# Patient Record
Sex: MALE | Race: OTHER | Employment: UNEMPLOYED | URBAN - METROPOLITAN AREA
[De-identification: names, ages, dates, MRNs, and addresses within clinical notes are randomized per-mention and may not be internally consistent; named-entity substitution may affect disease eponyms.]

---

## 2021-08-10 ENCOUNTER — OFFICE VISIT (OUTPATIENT)
Dept: FAMILY MEDICINE CLINIC | Age: 56
End: 2021-08-10
Payer: COMMERCIAL

## 2021-08-10 VITALS
WEIGHT: 260.6 LBS | BODY MASS INDEX: 35.3 KG/M2 | DIASTOLIC BLOOD PRESSURE: 76 MMHG | OXYGEN SATURATION: 95 % | SYSTOLIC BLOOD PRESSURE: 122 MMHG | HEART RATE: 87 BPM | HEIGHT: 72 IN

## 2021-08-10 DIAGNOSIS — Z13.220 LIPID SCREENING: ICD-10-CM

## 2021-08-10 DIAGNOSIS — J45.20 MILD INTERMITTENT ASTHMA WITHOUT COMPLICATION: ICD-10-CM

## 2021-08-10 DIAGNOSIS — F17.200 TOBACCO USE DISORDER: ICD-10-CM

## 2021-08-10 DIAGNOSIS — H00.011 HORDEOLUM EXTERNUM OF RIGHT UPPER EYELID: ICD-10-CM

## 2021-08-10 DIAGNOSIS — E78.2 MIXED HYPERLIPIDEMIA: ICD-10-CM

## 2021-08-10 DIAGNOSIS — E03.9 ACQUIRED HYPOTHYROIDISM: Primary | ICD-10-CM

## 2021-08-10 DIAGNOSIS — F25.9 SCHIZO AFFECTIVE SCHIZOPHRENIA (HCC): ICD-10-CM

## 2021-08-10 DIAGNOSIS — R73.03 PREDIABETES: ICD-10-CM

## 2021-08-10 PROCEDURE — 3017F COLORECTAL CA SCREEN DOC REV: CPT | Performed by: FAMILY MEDICINE

## 2021-08-10 PROCEDURE — G8427 DOCREV CUR MEDS BY ELIG CLIN: HCPCS | Performed by: FAMILY MEDICINE

## 2021-08-10 PROCEDURE — G8417 CALC BMI ABV UP PARAM F/U: HCPCS | Performed by: FAMILY MEDICINE

## 2021-08-10 PROCEDURE — 4004F PT TOBACCO SCREEN RCVD TLK: CPT | Performed by: FAMILY MEDICINE

## 2021-08-10 PROCEDURE — 99204 OFFICE O/P NEW MOD 45 MIN: CPT | Performed by: FAMILY MEDICINE

## 2021-08-10 RX ORDER — RISPERIDONE 2 MG/1
2 TABLET, FILM COATED ORAL NIGHTLY
Qty: 60 TABLET | Status: SHIPPED | COMMUNITY
Start: 2021-08-10

## 2021-08-10 RX ORDER — LEVOTHYROXINE SODIUM 0.05 MG/1
50 TABLET ORAL DAILY
Qty: 90 TABLET | Refills: 3 | Status: SHIPPED | OUTPATIENT
Start: 2021-08-10

## 2021-08-10 RX ORDER — MONTELUKAST SODIUM 10 MG/1
10 TABLET ORAL NIGHTLY
Qty: 90 TABLET | Refills: 3 | Status: SHIPPED | OUTPATIENT
Start: 2021-08-10 | End: 2022-10-18

## 2021-08-10 RX ORDER — DIVALPROEX SODIUM 500 MG/1
500 TABLET, DELAYED RELEASE ORAL 3 TIMES DAILY
Qty: 90 TABLET | Status: SHIPPED | COMMUNITY
Start: 2021-08-10

## 2021-08-10 RX ORDER — ASPIRIN 81 MG/1
81 TABLET ORAL DAILY
COMMUNITY
End: 2021-08-10 | Stop reason: SDUPTHER

## 2021-08-10 RX ORDER — LEVOTHYROXINE SODIUM 0.05 MG/1
50 TABLET ORAL DAILY
COMMUNITY
End: 2021-08-10 | Stop reason: SDUPTHER

## 2021-08-10 RX ORDER — ATORVASTATIN CALCIUM 10 MG/1
10 TABLET, FILM COATED ORAL DAILY
COMMUNITY
End: 2021-08-10 | Stop reason: SDUPTHER

## 2021-08-10 RX ORDER — ASPIRIN 81 MG/1
81 TABLET ORAL DAILY
Qty: 90 TABLET | Refills: 3 | Status: SHIPPED | OUTPATIENT
Start: 2021-08-10

## 2021-08-10 RX ORDER — MONTELUKAST SODIUM 10 MG/1
10 TABLET ORAL NIGHTLY
COMMUNITY
End: 2021-08-10 | Stop reason: SDUPTHER

## 2021-08-10 RX ORDER — ATORVASTATIN CALCIUM 10 MG/1
10 TABLET, FILM COATED ORAL DAILY
Qty: 90 TABLET | Refills: 3 | Status: SHIPPED | OUTPATIENT
Start: 2021-08-10

## 2021-08-10 ASSESSMENT — PATIENT HEALTH QUESTIONNAIRE - PHQ9
SUM OF ALL RESPONSES TO PHQ QUESTIONS 1-9: 0
2. FEELING DOWN, DEPRESSED OR HOPELESS: 0
SUM OF ALL RESPONSES TO PHQ QUESTIONS 1-9: 0
SUM OF ALL RESPONSES TO PHQ QUESTIONS 1-9: 0
1. LITTLE INTEREST OR PLEASURE IN DOING THINGS: 0
SUM OF ALL RESPONSES TO PHQ9 QUESTIONS 1 & 2: 0

## 2021-08-10 ASSESSMENT — ENCOUNTER SYMPTOMS
DIARRHEA: 0
BACK PAIN: 0
ABDOMINAL PAIN: 0
COUGH: 0
CONSTIPATION: 0
SORE THROAT: 0
SHORTNESS OF BREATH: 0
SINUS PRESSURE: 0

## 2021-08-10 NOTE — PROGRESS NOTES
Arsenio Leak Marker  1965  08/10/21    Chief Complaint   Patient presents with   [facility]     Patient here to establish care with PCP           60years old man with past medical history of schizophrenic disorder, hypothyroidism, asthma, and prediabetic. Came today to establish with us, patient doing fine has no complaints and needs medication refills. He does follow-up with the psychiatrist every 3 months. His asthma under control. Past Medical History:   Diagnosis Date    Anxiety      Past Surgical History:   Procedure Laterality Date    DENTAL SURGERY       History reviewed. No pertinent family history. Social History     Socioeconomic History    Marital status: Single     Spouse name: Not on file    Number of children: Not on file    Years of education: Not on file    Highest education level: Not on file   Occupational History    Not on file   Tobacco Use    Smoking status: Current Every Day Smoker     Packs/day: 0.50     Types: Cigarettes    Smokeless tobacco: Never Used   Substance and Sexual Activity    Alcohol use: Yes    Drug use: No    Sexual activity: Yes     Partners: Female   Other Topics Concern    Not on file   Social History Narrative    Not on file     Social Determinants of Health     Financial Resource Strain:     Difficulty of Paying Living Expenses:    Food Insecurity:     Worried About Running Out of Food in the Last Year:     [address] in the Last Year:    Transportation Needs:     Lack of Transportation (Medical):      Lack of Transportation (Non-Medical):    Physical Activity:     Days of Exercise per Week:     Minutes of Exercise per Session:    Stress:     Feeling of Stress :    Social Connections:     Frequency of Communication with Friends and Family:     Frequency of Social Gatherings with Friends and Family:     Attends [employer] Services:     Active Member of Clubs or Organizations:     Attends Club or Organization Meetings:     Marital Status:    Intimate Partner Violence:     Fear of Current or Ex-Partner:     Emotionally Abused:     Physically Abused:     Sexually Abused:        No Known Allergies  Current Outpatient Medications   Medication Sig Dispense Refill    montelukast (SINGULAIR) 10 MG tablet Take 1 tablet by mouth nightly 90 tablet 3    levothyroxine (SYNTHROID) 50 MCG tablet Take 1 tablet by mouth Daily 90 tablet 3    aspirin EC 81 MG EC tablet Take 1 tablet by mouth daily 90 tablet 3    atorvastatin (LIPITOR) 10 MG tablet Take 1 tablet by mouth daily 90 tablet 3    divalproex (DEPAKOTE) 500 MG DR tablet Take 500 mg by mouth 3 times daily. (Patient not taking: Reported on 8/10/2021)      risperiDONE (RISPERDAL) 2 MG tablet Take 2 mg by mouth nightly. (Patient not taking: Reported on 8/10/2021)      risperiDONE (RISPERDAL) 0.5 MG tablet Take 0.5 mg by mouth daily. No current facility-administered medications for this visit. Review of Systems   Constitutional: Negative for activity change, appetite change, chills, fatigue and fever. HENT: Negative for congestion, postnasal drip, sinus pressure and sore throat. Respiratory: Negative for cough and shortness of breath. Cardiovascular: Negative for chest pain and leg swelling. Gastrointestinal: Negative for abdominal pain, constipation and diarrhea. Genitourinary: Negative for dysuria and frequency. Musculoskeletal: Negative for back pain and gait problem. Skin: Negative for rash. Neurological: Negative for dizziness, weakness and headaches. Psychiatric/Behavioral: Negative for agitation and behavioral problems. The patient is not nervous/anxious.         Lab Results   Component Value Date    WBC 8.6 08/25/2014    HGB 14.3 08/25/2014    HCT 42.7 08/25/2014    MCV 90.3 08/25/2014     08/25/2014     Lab Results   Component Value Date     08/25/2014    K 3.9 08/25/2014    CL 99 08/25/2014    CO2 28 08/25/2014    BUN 13 08/25/2014 CREATININE 0.8 (L) 08/25/2014    GLUCOSE 108 08/10/2014    CALCIUM 9.1 08/25/2014    PROT 6.3 (L) 08/25/2014    LABALBU 4.2 08/25/2014    BILITOT 0.3 08/25/2014    ALKPHOS 68 08/25/2014    AST 16 08/25/2014    ALT 24 08/25/2014    LABGLOM >60 08/25/2014    GFRAA >60 08/25/2014     No results found for: CHOL  No results found for: TRIG  No results found for: HDL  No results found for: LDLCALC, LDLCHOLESTEROL  No results found for: LABA1C  Lab Results   Component Value Date    TSHHS 1.450 08/25/2014         /76 (Site: Left Upper Arm, Position: Sitting, Cuff Size: Large Adult)   Pulse 87   Ht 6' (1.829 m)   Wt 260 lb 9.6 oz (118.2 kg)   SpO2 95%   BMI 35.34 kg/m²     BP Readings from Last 3 Encounters:   08/10/21 122/76       Wt Readings from Last 3 Encounters:   08/10/21 260 lb 9.6 oz (118.2 kg)         Physical Exam  Constitutional:       General: He is not in acute distress. Appearance: Normal appearance. He is well-developed. He is obese. He is not diaphoretic. HENT:      Head: Normocephalic and atraumatic. Eyes:      Pupils: Pupils are equal, round, and reactive to light. Cardiovascular:      Rate and Rhythm: Normal rate and regular rhythm. Heart sounds: Normal heart sounds. No murmur heard. Pulmonary:      Effort: Pulmonary effort is normal.      Breath sounds: Normal breath sounds. No wheezing. Abdominal:      General: There is no distension. Palpations: Abdomen is soft. There is no mass. Tenderness: There is no abdominal tenderness. Musculoskeletal:         General: Normal range of motion. Cervical back: Normal range of motion and neck supple. No rigidity. Right lower leg: No edema. Left lower leg: No edema. Neurological:      General: No focal deficit present. Mental Status: He is alert and oriented to person, place, and time.    Psychiatric:         Mood and Affect: Mood normal.         Behavior: Behavior normal.         ASSESSMENT/ PLAN:    1. Acquired hypothyroidism  - per patient stable  - levothyroxine (SYNTHROID) 50 MCG tablet; Take 1 tablet by mouth Daily  Dispense: 90 tablet; Refill: 3  - TSH without Reflex; Future  - T4, Free; Future    2. Mild intermittent asthma without complication  - undercontrol  - montelukast (SINGULAIR) 10 MG tablet; Take 1 tablet by mouth nightly  Dispense: 90 tablet; Refill: 3  - aspirin EC 81 MG EC tablet; Take 1 tablet by mouth daily  Dispense: 90 tablet; Refill: 3    3. Schizo affective schizophrenia ([facility] [address]  - f/u with the   - Comprehensive Metabolic Panel, Fasting; Future  - CBC Auto Differential; Future  - Vitamin B12 & Folate; Future    4. Prediabetes  - Hemoglobin A1C; Future    5. Tobacco use disorder  - encourage smoking cessation    6. Lipid screening  - Lipid Panel; Future    7. Mixed hyperlipidemia  - atorvastatin (LIPITOR) 10 MG tablet; Take 1 tablet by mouth daily  Dispense: 90 tablet; Refill: 3  - Lipid Panel; Future    8. hordelium externum of the R eye use warm compresser if not working need to see the gaby Pena          - All old blood work reviewed with the patient  - Appropriate prescription are addressed. - After visit summery provided. - Questions answered and patient verbalizes understanding.  - Call for any problem, questions, or concerns. Return in about 6 months (around 2/10/2022).

## 2021-08-11 ENCOUNTER — TELEPHONE (OUTPATIENT)
Dept: FAMILY MEDICINE CLINIC | Age: 56
End: 2021-08-11

## 2021-08-11 RX ORDER — OMEPRAZOLE 10 MG/1
10 CAPSULE, DELAYED RELEASE ORAL DAILY
Qty: 90 CAPSULE | Refills: 3 | Status: SHIPPED | OUTPATIENT
Start: 2021-08-11 | End: 2021-08-12 | Stop reason: SDUPTHER

## 2021-08-11 RX ORDER — OMEPRAZOLE 10 MG/1
10 CAPSULE, DELAYED RELEASE ORAL DAILY
COMMUNITY
End: 2021-08-11 | Stop reason: SDUPTHER

## 2021-08-11 NOTE — TELEPHONE ENCOUNTER
How much the dose, he didn't mention the omeprazole yesterday, we did refill all his medication yesterday  And he is taking medications from the mental clinic told him we need to know the names of his other medication, suppose to call the clinic and let us know

## 2021-08-12 ENCOUNTER — TELEPHONE (OUTPATIENT)
Dept: FAMILY MEDICINE CLINIC | Age: 56
End: 2021-08-12

## 2021-08-12 RX ORDER — OMEPRAZOLE 20 MG/1
10 CAPSULE, DELAYED RELEASE ORAL DAILY
Qty: 90 CAPSULE | Refills: 3 | Status: SHIPPED | OUTPATIENT
Start: 2021-08-12

## 2021-08-12 NOTE — TELEPHONE ENCOUNTER
Patient called and wanted doctor to know that his Omeprazole is needed to be 20 mg delayed release capsule. Wanted to know if provider could send this to the pharmacy.

## 2021-11-08 DIAGNOSIS — E03.9 ACQUIRED HYPOTHYROIDISM: ICD-10-CM

## 2021-11-08 DIAGNOSIS — E78.2 MIXED HYPERLIPIDEMIA: ICD-10-CM

## 2021-11-08 DIAGNOSIS — Z13.220 LIPID SCREENING: ICD-10-CM

## 2021-11-08 DIAGNOSIS — R73.03 PREDIABETES: ICD-10-CM

## 2021-11-08 DIAGNOSIS — F25.9 SCHIZO AFFECTIVE SCHIZOPHRENIA (HCC): ICD-10-CM

## 2021-11-08 LAB
A/G RATIO: 2.2 (ref 1.1–2.2)
ALBUMIN SERPL-MCNC: 4.7 G/DL (ref 3.4–5)
ALP BLD-CCNC: 82 U/L (ref 40–129)
ALT SERPL-CCNC: 18 U/L (ref 10–40)
ANION GAP SERPL CALCULATED.3IONS-SCNC: 15 MMOL/L (ref 3–16)
AST SERPL-CCNC: 17 U/L (ref 15–37)
BASOPHILS ABSOLUTE: 0.1 K/UL (ref 0–0.2)
BASOPHILS RELATIVE PERCENT: 1 %
BILIRUB SERPL-MCNC: <0.2 MG/DL (ref 0–1)
BUN BLDV-MCNC: 8 MG/DL (ref 7–20)
CALCIUM SERPL-MCNC: 8.9 MG/DL (ref 8.3–10.6)
CHLORIDE BLD-SCNC: 98 MMOL/L (ref 99–110)
CHOLESTEROL, TOTAL: 146 MG/DL (ref 0–199)
CO2: 24 MMOL/L (ref 21–32)
CREAT SERPL-MCNC: 0.8 MG/DL (ref 0.9–1.3)
EOSINOPHILS ABSOLUTE: 0.2 K/UL (ref 0–0.6)
EOSINOPHILS RELATIVE PERCENT: 2.9 %
FOLATE: 19.5 NG/ML (ref 4.78–24.2)
GFR AFRICAN AMERICAN: >60
GFR NON-AFRICAN AMERICAN: >60
GLUCOSE FASTING: 67 MG/DL (ref 70–99)
HCT VFR BLD CALC: 44.9 % (ref 40.5–52.5)
HDLC SERPL-MCNC: 40 MG/DL (ref 40–60)
HEMOGLOBIN: 15.1 G/DL (ref 13.5–17.5)
LDL CHOLESTEROL CALCULATED: 73 MG/DL
LYMPHOCYTES ABSOLUTE: 2.4 K/UL (ref 1–5.1)
LYMPHOCYTES RELATIVE PERCENT: 30.1 %
MCH RBC QN AUTO: 30.2 PG (ref 26–34)
MCHC RBC AUTO-ENTMCNC: 33.6 G/DL (ref 31–36)
MCV RBC AUTO: 90 FL (ref 80–100)
MONOCYTES ABSOLUTE: 1 K/UL (ref 0–1.3)
MONOCYTES RELATIVE PERCENT: 13.3 %
NEUTROPHILS ABSOLUTE: 4.1 K/UL (ref 1.7–7.7)
NEUTROPHILS RELATIVE PERCENT: 52.7 %
PDW BLD-RTO: 13.6 % (ref 12.4–15.4)
PLATELET # BLD: 265 K/UL (ref 135–450)
PMV BLD AUTO: 8.2 FL (ref 5–10.5)
POTASSIUM SERPL-SCNC: 4.5 MMOL/L (ref 3.5–5.1)
RBC # BLD: 4.99 M/UL (ref 4.2–5.9)
SODIUM BLD-SCNC: 137 MMOL/L (ref 136–145)
T4 FREE: 1.6 NG/DL (ref 0.9–1.8)
TOTAL PROTEIN: 6.8 G/DL (ref 6.4–8.2)
TRIGL SERPL-MCNC: 166 MG/DL (ref 0–150)
TSH SERPL DL<=0.05 MIU/L-ACNC: 1.25 UIU/ML (ref 0.27–4.2)
VITAMIN B-12: 648 PG/ML (ref 211–911)
VLDLC SERPL CALC-MCNC: 33 MG/DL
WBC # BLD: 7.9 K/UL (ref 4–11)

## 2021-11-09 ENCOUNTER — TELEPHONE (OUTPATIENT)
Dept: FAMILY MEDICINE CLINIC | Age: 56
End: 2021-11-09

## 2021-11-09 DIAGNOSIS — Z12.11 COLON CANCER SCREENING: Primary | ICD-10-CM

## 2021-11-09 LAB
ESTIMATED AVERAGE GLUCOSE: 125.5 MG/DL
HBA1C MFR BLD: 6 %

## 2021-11-09 NOTE — TELEPHONE ENCOUNTER
Test was in book but no order in chart. Please sign order.  Results already documented on 8/10/21 in book when test was given

## 2022-02-10 ENCOUNTER — OFFICE VISIT (OUTPATIENT)
Dept: FAMILY MEDICINE CLINIC | Age: 57
End: 2022-02-10
Payer: COMMERCIAL

## 2022-02-10 VITALS
SYSTOLIC BLOOD PRESSURE: 132 MMHG | WEIGHT: 251.4 LBS | HEIGHT: 72 IN | OXYGEN SATURATION: 93 % | HEART RATE: 95 BPM | DIASTOLIC BLOOD PRESSURE: 68 MMHG | BODY MASS INDEX: 34.05 KG/M2

## 2022-02-10 DIAGNOSIS — Z12.11 COLON CANCER SCREENING: ICD-10-CM

## 2022-02-10 DIAGNOSIS — Z00.00 ROUTINE GENERAL MEDICAL EXAMINATION AT A HEALTH CARE FACILITY: Primary | ICD-10-CM

## 2022-02-10 PROCEDURE — 3017F COLORECTAL CA SCREEN DOC REV: CPT | Performed by: FAMILY MEDICINE

## 2022-02-10 PROCEDURE — G0402 INITIAL PREVENTIVE EXAM: HCPCS | Performed by: FAMILY MEDICINE

## 2022-02-10 RX ORDER — BENZTROPINE MESYLATE 1 MG/1
1 TABLET ORAL 2 TIMES DAILY
COMMUNITY
Start: 2021-11-22

## 2022-02-10 ASSESSMENT — PATIENT HEALTH QUESTIONNAIRE - PHQ9
SUM OF ALL RESPONSES TO PHQ QUESTIONS 1-9: 0
SUM OF ALL RESPONSES TO PHQ9 QUESTIONS 1 & 2: 0
SUM OF ALL RESPONSES TO PHQ QUESTIONS 1-9: 0
2. FEELING DOWN, DEPRESSED OR HOPELESS: 0
1. LITTLE INTEREST OR PLEASURE IN DOING THINGS: 0
SUM OF ALL RESPONSES TO PHQ QUESTIONS 1-9: 0
SUM OF ALL RESPONSES TO PHQ QUESTIONS 1-9: 0

## 2022-02-10 ASSESSMENT — LIFESTYLE VARIABLES
HOW OFTEN DO YOU HAVE A DRINK CONTAINING ALCOHOL: NEVER
HOW OFTEN DO YOU HAVE A DRINK CONTAINING ALCOHOL: 0
AUDIT TOTAL SCORE: INCOMPLETE
AUDIT-C TOTAL SCORE: INCOMPLETE

## 2022-02-10 NOTE — PATIENT INSTRUCTIONS
Personalized Preventive Plan for Michelet Alegria - 2/10/2022  Medicare offers a range of preventive health benefits. Some of the tests and screenings are paid in full while other may be subject to a deductible, co-insurance, and/or copay. Some of these benefits include a comprehensive review of your medical history including lifestyle, illnesses that may run in your family, and various assessments and screenings as appropriate. After reviewing your medical record and screening and assessments performed today your provider may have ordered immunizations, labs, imaging, and/or referrals for you. A list of these orders (if applicable) as well as your Preventive Care list are included within your After Visit Summary for your review. Other Preventive Recommendations:    · A preventive eye exam performed by an eye specialist is recommended every 1-2 years to screen for glaucoma; cataracts, macular degeneration, and other eye disorders. · A preventive dental visit is recommended every 6 months. · Try to get at least 150 minutes of exercise per week or 10,000 steps per day on a pedometer . · Order or download the FREE \"Exercise & Physical Activity: Your Everyday Guide\" from [employer]. Call 2-173.198.4211 or search [employer] online. · You need 0834-1637 mg of calcium and 0043-7065 IU of vitamin D per day. It is possible to meet your calcium requirement with diet alone, but a vitamin D supplement is usually necessary to meet this goal.  · When exposed to the sun, use a sunscreen that protects against both UVA and UVB radiation with an SPF of 30 or greater. Reapply every 2 to 3 hours or after sweating, drying off with a towel, or swimming. · Always wear a seat belt when traveling in a car. Always wear a helmet when riding a bicycle or motorcycle.

## 2022-02-10 NOTE — PROGRESS NOTES
Medicare Annual Wellness Visit  Name: Mariah Betancur Date: 2022   MRN: [id number] Sex: Male   Age: 60 y.o. Ethnicity: Non- / Non    : 1965 Race: Other      Susy Chavez is here for Medicare AWV    Screenings for behavioral, psychosocial and functional/safety risks, and cognitive dysfunction are all negative except as indicated below. These results, as well as other patient data from the [address] form, are documented in Flowsheets linked to this Encounter. No Known Allergies      Prior to Visit Medications    Medication Sig Taking? Authorizing Provider   benztropine (COGENTIN) 1 MG tablet Take 1 tablet by mouth 2 times daily Yes Historical Provider, MD   omeprazole (PRILOSEC) 20 MG delayed release capsule Take 1 capsule by mouth daily Yes Brody Cano MD   montelukast (SINGULAIR) 10 MG tablet Take 1 tablet by mouth nightly Yes Brody Cano MD   levothyroxine (SYNTHROID) 50 MCG tablet Take 1 tablet by mouth Daily Yes Brody Cano MD   aspirin EC 81 MG EC tablet Take 1 tablet by mouth daily Yes Brody Cano MD   atorvastatin (LIPITOR) 10 MG tablet Take 1 tablet by mouth daily Yes Brody Cano MD   risperiDONE (RISPERDAL) 2 MG tablet Take 1 tablet by mouth nightly Yes Brody Cano MD   divalproex (DEPAKOTE) 500 MG DR tablet Take 1 tablet by mouth 3 times daily Yes Brody Cano MD   risperiDONE (RISPERDAL) 0.5 MG tablet Take 0.5 mg by mouth daily. Yes Historical Provider, MD         Past Medical History:   Diagnosis Date    Anxiety        Past Surgical History:   Procedure Laterality Date    DENTAL SURGERY         No family history on file.     CareTeam (Including outside providers/suppliers regularly involved in providing care):   Patient Care Team:  Brody Cano MD as PCP - General (Family Medicine)  Brody Cano MD as PCP - [facility] Provider    Wt Readings from Last 3 Encounters:   02/10/22 251 lb 6.4 oz (114 kg)   08/10/21 260 lb 9.6 lost any weight without trying in the past 3 months?: No  Do you eat only one meal per day?: No  Have you seen the dentist within the past year?: (!) No  Body mass index: (!) 34.09  Health Habits/Nutrition Interventions:  · has no teeth, but chewing okay    Hearing/Vision:  No exam data present  Hearing/Vision  Do you or your family notice any trouble with your hearing that hasn't been managed with hearing aids?: No  Do you have difficulty driving, watching TV, or doing any of your daily activities because of your eyesight?: No  Have you had an eye exam within the past year?: (!) No  Hearing/Vision Interventions:  · both okay    Safety:  Safety  Do you have working smoke detectors?: Yes  Have all throw rugs been removed or fastened?: Yes  Do you have non-slip mats or surfaces in all bathtubs/showers?: (!) No  Do all of your stairways have a railing or banister?: Yes  Are your doorways, halls and stairs free of clutter?: Yes  Do you always fasten your seatbelt when you are in a car?: Yes  Safety Interventions:  · both doing fine       Personalized Preventive Plan   Current Health Maintenance Status    There is no immunization history on file for this patient.      Health Maintenance   Topic Date Due    Hepatitis C screen  Never done    COVID-19 Vaccine (1) Never done    Pneumococcal 0-64 years Vaccine (1 of 2 - PPSV23) Never done    HIV screen  Never done    DTaP/Tdap/Td vaccine (1 - Tdap) Never done    Colon cancer screen colonoscopy  Never done    Shingles Vaccine (1 of 2) Never done    Annual Wellness Visit (AWV)  Never done    Flu vaccine (1) Never done    A1C test (Diabetic or Prediabetic)  11/08/2022    Lipid screen  11/08/2022    TSH testing  11/08/2022    Depression Screen  02/10/2023    Hepatitis A vaccine  Aged Out    Hepatitis B vaccine  Aged Out    Hib vaccine  Aged Out    Meningococcal (ACWY) vaccine  Aged Out     Recommendations for [employer] Due: see orders and patient instructions/AVS.  . Recommended screening schedule for the next 5-10 years is provided to the patient in written form: see Patient Instructions/AVS.    Brittni Chatterjee was seen today for medicare aw. Diagnoses and all orders for this visit:    Routine general medical examination at a [facility] facility    Colon cancer screening  -     POCT Fecal Immunochemical Test (FIT);  Future

## 2022-02-15 ENCOUNTER — HOSPITAL ENCOUNTER (INPATIENT)
Age: 57
LOS: 3 days | Discharge: PSYCHIATRIC OR SUBSTANCE USE FACILITY | DRG: 640 | End: 2022-02-18
Attending: EMERGENCY MEDICINE | Admitting: HOSPITALIST
Payer: COMMERCIAL

## 2022-02-15 ENCOUNTER — APPOINTMENT (OUTPATIENT)
Dept: GENERAL RADIOLOGY | Age: 57
DRG: 640 | End: 2022-02-15
Payer: COMMERCIAL

## 2022-02-15 DIAGNOSIS — U07.1 COVID-19: ICD-10-CM

## 2022-02-15 DIAGNOSIS — F22 PARANOID IDEATION (HCC): Primary | ICD-10-CM

## 2022-02-15 DIAGNOSIS — E87.1 HYPONATREMIA: ICD-10-CM

## 2022-02-15 LAB
ACETAMINOPHEN LEVEL: <5 UG/ML (ref 15–30)
ALBUMIN SERPL-MCNC: 4 GM/DL (ref 3.4–5)
ALCOHOL SCREEN SERUM: <0.01 %WT/VOL
ALP BLD-CCNC: 85 IU/L (ref 40–128)
ALT SERPL-CCNC: 24 U/L (ref 10–40)
AMPHETAMINES: NEGATIVE
ANION GAP SERPL CALCULATED.3IONS-SCNC: 11 MMOL/L (ref 4–16)
ANION GAP SERPL CALCULATED.3IONS-SCNC: 11 MMOL/L (ref 4–16)
AST SERPL-CCNC: 26 IU/L (ref 15–37)
BACTERIA: NEGATIVE /HPF
BANDED NEUTROPHILS ABSOLUTE COUNT: 0.18 K/CU MM
BANDED NEUTROPHILS RELATIVE PERCENT: 2 % (ref 5–11)
BARBITURATE SCREEN URINE: NEGATIVE
BENZODIAZEPINE SCREEN, URINE: NEGATIVE
BILIRUB SERPL-MCNC: 0.4 MG/DL (ref 0–1)
BILIRUBIN URINE: NEGATIVE MG/DL
BLOOD, URINE: ABNORMAL
BUN BLDV-MCNC: 11 MG/DL (ref 6–23)
BUN BLDV-MCNC: 8 MG/DL (ref 6–23)
BURR CELLS: ABNORMAL
CALCIUM SERPL-MCNC: 7.8 MG/DL (ref 8.3–10.6)
CALCIUM SERPL-MCNC: 8.5 MG/DL (ref 8.3–10.6)
CANNABINOID SCREEN URINE: NEGATIVE
CHLORIDE BLD-SCNC: 85 MMOL/L (ref 99–110)
CHLORIDE BLD-SCNC: 86 MMOL/L (ref 99–110)
CLARITY: ABNORMAL
CO2: 23 MMOL/L (ref 21–32)
CO2: 24 MMOL/L (ref 21–32)
COCAINE METABOLITE: NEGATIVE
COLOR: YELLOW
CREAT SERPL-MCNC: 0.5 MG/DL (ref 0.9–1.3)
CREAT SERPL-MCNC: 0.7 MG/DL (ref 0.9–1.3)
DIFFERENTIAL TYPE: ABNORMAL
DOSE AMOUNT: ABNORMAL
DOSE TIME: ABNORMAL
EOSINOPHILS ABSOLUTE: 0.1 K/CU MM
EOSINOPHILS RELATIVE PERCENT: 1 % (ref 0–3)
GFR AFRICAN AMERICAN: >60 ML/MIN/1.73M2
GFR AFRICAN AMERICAN: >60 ML/MIN/1.73M2
GFR NON-AFRICAN AMERICAN: >60 ML/MIN/1.73M2
GFR NON-AFRICAN AMERICAN: >60 ML/MIN/1.73M2
GLUCOSE BLD-MCNC: 113 MG/DL (ref 70–99)
GLUCOSE BLD-MCNC: 81 MG/DL (ref 70–99)
GLUCOSE, URINE: NEGATIVE MG/DL
HCT VFR BLD CALC: 41.8 % (ref 42–52)
HEMOGLOBIN: 14.8 GM/DL (ref 13.5–18)
KETONES, URINE: ABNORMAL MG/DL
LEUKOCYTE ESTERASE, URINE: NEGATIVE
LYMPHOCYTES ABSOLUTE: 0.9 K/CU MM
LYMPHOCYTES RELATIVE PERCENT: 10 % (ref 24–44)
MAGNESIUM: 1.7 MG/DL (ref 1.8–2.4)
MCH RBC QN AUTO: 29.9 PG (ref 27–31)
MCHC RBC AUTO-ENTMCNC: 35.4 % (ref 32–36)
MCV RBC AUTO: 84.4 FL (ref 78–100)
MONOCYTES ABSOLUTE: 1.5 K/CU MM
MONOCYTES RELATIVE PERCENT: 17 % (ref 0–4)
MUCUS: ABNORMAL HPF
NITRITE URINE, QUANTITATIVE: NEGATIVE
OPIATES, URINE: NEGATIVE
OXYCODONE: NEGATIVE
PDW BLD-RTO: 12.1 % (ref 11.7–14.9)
PH, URINE: 6 (ref 5–8)
PHENCYCLIDINE, URINE: NEGATIVE
PLATELET # BLD: 291 K/CU MM (ref 140–440)
PMV BLD AUTO: 9.2 FL (ref 7.5–11.1)
POTASSIUM SERPL-SCNC: 3.4 MMOL/L (ref 3.5–5.1)
POTASSIUM SERPL-SCNC: 3.5 MMOL/L (ref 3.5–5.1)
PROTEIN UA: ABNORMAL MG/DL
RBC # BLD: 4.95 M/CU MM (ref 4.6–6.2)
RBC URINE: 2 /HPF (ref 0–3)
SALICYLATE LEVEL: <0.3 MG/DL (ref 15–30)
SARS-COV-2, NAAT: DETECTED
SEGMENTED NEUTROPHILS ABSOLUTE COUNT: 6.3 K/CU MM
SEGMENTED NEUTROPHILS RELATIVE PERCENT: 70 % (ref 36–66)
SODIUM BLD-SCNC: 119 MMOL/L (ref 135–145)
SODIUM BLD-SCNC: 121 MMOL/L (ref 135–145)
SOURCE: ABNORMAL
SPECIFIC GRAVITY UA: 1.02 (ref 1–1.03)
T4 FREE: 1.63 NG/DL (ref 0.9–1.8)
TOTAL PROTEIN: 6.4 GM/DL (ref 6.4–8.2)
TSH HIGH SENSITIVITY: 1.27 UIU/ML (ref 0.27–4.2)
UNCLASSIFIED CAST: 12 /LPF
UROBILINOGEN, URINE: 2 MG/DL (ref 0.2–1)
VALPROIC ACID LEVEL: <2.8 UG/ML (ref 50–100)
WBC # BLD: 9 K/CU MM (ref 4–10.5)
WBC UA: 2 /HPF (ref 0–2)

## 2022-02-15 PROCEDURE — 85027 COMPLETE CBC AUTOMATED: CPT

## 2022-02-15 PROCEDURE — 80048 BASIC METABOLIC PNL TOTAL CA: CPT

## 2022-02-15 PROCEDURE — 71045 X-RAY EXAM CHEST 1 VIEW: CPT

## 2022-02-15 PROCEDURE — G0480 DRUG TEST DEF 1-7 CLASSES: HCPCS

## 2022-02-15 PROCEDURE — 2060000000 HC ICU INTERMEDIATE R&B

## 2022-02-15 PROCEDURE — 36415 COLL VENOUS BLD VENIPUNCTURE: CPT

## 2022-02-15 PROCEDURE — 96360 HYDRATION IV INFUSION INIT: CPT

## 2022-02-15 PROCEDURE — 84439 ASSAY OF FREE THYROXINE: CPT

## 2022-02-15 PROCEDURE — 99285 EMERGENCY DEPT VISIT HI MDM: CPT

## 2022-02-15 PROCEDURE — 83735 ASSAY OF MAGNESIUM: CPT

## 2022-02-15 PROCEDURE — 6360000002 HC RX W HCPCS: Performed by: STUDENT IN AN ORGANIZED HEALTH CARE EDUCATION/TRAINING PROGRAM

## 2022-02-15 PROCEDURE — 2580000003 HC RX 258: Performed by: NURSE PRACTITIONER

## 2022-02-15 PROCEDURE — 84443 ASSAY THYROID STIM HORMONE: CPT

## 2022-02-15 PROCEDURE — 80053 COMPREHEN METABOLIC PANEL: CPT

## 2022-02-15 PROCEDURE — 2580000003 HC RX 258: Performed by: EMERGENCY MEDICINE

## 2022-02-15 PROCEDURE — 80164 ASSAY DIPROPYLACETIC ACD TOT: CPT

## 2022-02-15 PROCEDURE — 81001 URINALYSIS AUTO W/SCOPE: CPT

## 2022-02-15 PROCEDURE — 85007 BL SMEAR W/DIFF WBC COUNT: CPT

## 2022-02-15 PROCEDURE — 87635 SARS-COV-2 COVID-19 AMP PRB: CPT

## 2022-02-15 PROCEDURE — 80307 DRUG TEST PRSMV CHEM ANLYZR: CPT

## 2022-02-15 RX ORDER — POLYETHYLENE GLYCOL 3350 17 G/17G
17 POWDER, FOR SOLUTION ORAL DAILY PRN
Status: DISCONTINUED | OUTPATIENT
Start: 2022-02-15 | End: 2022-02-18 | Stop reason: HOSPADM

## 2022-02-15 RX ORDER — ACETAMINOPHEN 325 MG/1
650 TABLET ORAL EVERY 4 HOURS PRN
Status: DISCONTINUED | OUTPATIENT
Start: 2022-02-15 | End: 2022-02-18 | Stop reason: HOSPADM

## 2022-02-15 RX ORDER — DIVALPROEX SODIUM 500 MG/1
500 TABLET, DELAYED RELEASE ORAL 3 TIMES DAILY
Status: DISCONTINUED | OUTPATIENT
Start: 2022-02-15 | End: 2022-02-18 | Stop reason: HOSPADM

## 2022-02-15 RX ORDER — MONTELUKAST SODIUM 10 MG/1
10 TABLET ORAL NIGHTLY
Status: DISCONTINUED | OUTPATIENT
Start: 2022-02-15 | End: 2022-02-18 | Stop reason: HOSPADM

## 2022-02-15 RX ORDER — LEVOTHYROXINE SODIUM 0.05 MG/1
50 TABLET ORAL DAILY
Status: DISCONTINUED | OUTPATIENT
Start: 2022-02-16 | End: 2022-02-18 | Stop reason: HOSPADM

## 2022-02-15 RX ORDER — RISPERIDONE 2 MG/1
2 TABLET, FILM COATED ORAL NIGHTLY
Status: DISCONTINUED | OUTPATIENT
Start: 2022-02-15 | End: 2022-02-18 | Stop reason: HOSPADM

## 2022-02-15 RX ORDER — SODIUM CHLORIDE 9 MG/ML
INJECTION, SOLUTION INTRAVENOUS CONTINUOUS
Status: ACTIVE | OUTPATIENT
Start: 2022-02-15 | End: 2022-02-16

## 2022-02-15 RX ORDER — GUAIFENESIN/DEXTROMETHORPHAN 100-10MG/5
5 SYRUP ORAL EVERY 4 HOURS PRN
Status: DISCONTINUED | OUTPATIENT
Start: 2022-02-15 | End: 2022-02-18 | Stop reason: HOSPADM

## 2022-02-15 RX ORDER — ATORVASTATIN CALCIUM 10 MG/1
10 TABLET, FILM COATED ORAL DAILY
Status: DISCONTINUED | OUTPATIENT
Start: 2022-02-16 | End: 2022-02-18 | Stop reason: HOSPADM

## 2022-02-15 RX ORDER — PANTOPRAZOLE SODIUM 40 MG/1
40 TABLET, DELAYED RELEASE ORAL
Status: DISCONTINUED | OUTPATIENT
Start: 2022-02-16 | End: 2022-02-18 | Stop reason: HOSPADM

## 2022-02-15 RX ORDER — MAGNESIUM SULFATE IN WATER 40 MG/ML
2000 INJECTION, SOLUTION INTRAVENOUS ONCE
Status: COMPLETED | OUTPATIENT
Start: 2022-02-15 | End: 2022-02-16

## 2022-02-15 RX ORDER — ASPIRIN 81 MG/1
81 TABLET ORAL DAILY
Status: DISCONTINUED | OUTPATIENT
Start: 2022-02-16 | End: 2022-02-18 | Stop reason: HOSPADM

## 2022-02-15 RX ORDER — RISPERIDONE 0.5 MG/1
0.5 TABLET, FILM COATED ORAL DAILY
Status: DISCONTINUED | OUTPATIENT
Start: 2022-02-16 | End: 2022-02-18 | Stop reason: HOSPADM

## 2022-02-15 RX ORDER — 0.9 % SODIUM CHLORIDE 0.9 %
1000 INTRAVENOUS SOLUTION INTRAVENOUS ONCE
Status: COMPLETED | OUTPATIENT
Start: 2022-02-15 | End: 2022-02-15

## 2022-02-15 RX ORDER — BENZTROPINE MESYLATE 1 MG/1
1 TABLET ORAL 2 TIMES DAILY
Status: DISCONTINUED | OUTPATIENT
Start: 2022-02-15 | End: 2022-02-18 | Stop reason: HOSPADM

## 2022-02-15 RX ORDER — POLYETHYLENE GLYCOL 3350 17 G
2 POWDER IN PACKET (EA) ORAL
Status: DISCONTINUED | OUTPATIENT
Start: 2022-02-15 | End: 2022-02-18 | Stop reason: HOSPADM

## 2022-02-15 RX ADMIN — SODIUM CHLORIDE 1000 ML: 9 INJECTION, SOLUTION INTRAVENOUS at 18:30

## 2022-02-15 RX ADMIN — SODIUM CHLORIDE: 9 INJECTION, SOLUTION INTRAVENOUS at 23:38

## 2022-02-15 RX ADMIN — MAGNESIUM SULFATE HEPTAHYDRATE 2000 MG: 2 INJECTION, SOLUTION INTRAVENOUS at 23:40

## 2022-02-15 NOTE — ED NOTES
Waiting on [facility] to call back about bed situation, will need UDS and covid result obtained prior to faxing packet to any facility for review      Juan David Herrera RN  02/15/22 3939

## 2022-02-15 NOTE — ED NOTES
Bed: ED-33  Expected date:   Expected time:   Means of arrival:   Comments:  1102 Eric Carroll RN  02/15/22 6709

## 2022-02-15 NOTE — ED NOTES
Report given to Binh Hairston RN. Care transferred at this time.       Pamula Schwab, RN  02/15/22 7226

## 2022-02-15 NOTE — ED NOTES
Chief Complaint:   Paranoia, not sleeping        Provisional Diagnosis:  Per medical chart: Schizoaffective schizophrenia      Risk, Psychosocial and Contextual Factors: (homeless, lack of social support etc.): Patient states he has not been sleeping and can't say the last time he did sleep. Patient states he has not been taking his medications. Current MH Treatment: Patient states he regularly sees Dr. Tennille Fabian @ [address] . Patient states he was scheduled to be seen by Dr. Tennille Fabian today, 2/15/22, but came to ED instead        Present Suicidal Behavior: Denies    Verbal: Denies    Attempt: Denies      Access to Weapons: Denies      C-SSRS Current Suicide Risk: Low, Moderate or High:     No risk      Past Suicidal Behavior:  Denies    Verbal:    Attempts:      Self-Injurious/Self-Mutilation: Denies      Traumatic Event Within Past 2 Weeks: Denies any significant changes      Current Abuse:  Denies      Legal:  Patient states he was in correction 21 years ago. Patient did not want to elaborate for reason. Violence: Denies      Protective Factors:  Lives with girlfriend and her son. States girlfriend is supportive as well as his mother and sisters. Patient does receive [facility] services regularly @ [address]: Lives in [place] in a house with girlfriend        Risk Factors:  Patient has not slept and can't remember the last time he was able to sleep. Patient admits to not taking his meds. Clinical Summary:  Patient is a 58 yo male presenting to ED voicing paranoia that people are trying to kill him. Patient states he has not slept in days and can't remember the last time he slept. Patient admits that he quit taking his medications. Patient denies SI/HI. Patient denies AVH. Patient was alert and cooperative during assessment. Patient denies any use of alcohol or drugs. Patient does receive out-patient [address] services through [facility] [address] with Dr. Tennille Fabian. Patient states his appetite is good.  Patient denies any triggering life events. Patient denies access to weapons. Patient denies self harming behaviors. Patient states he is agreeable to an in-patient stay to help with his sleep and adjust meds. Level of Care Disposition:      Consulted with medical provider. Patient is medically stabilized. Consulted with patients RN about abnormalities or medical concerns. No abnormalities or medical concerns noted. Consulted with_Dr. Ivett Alvarado and Aren Hutchinson, APRN-CNP_Patient to be admitted to psychiatric facility for treatment.              VANGIE Montes  02/15/22 0147

## 2022-02-15 NOTE — ED PROVIDER NOTES
APPEARANCE: Awake and alert. Cooperative. No acute distress. HEAD: Normocephalic. Atraumatic. EYES: Sclera anicteric. Pupils equal round reactive to light extraocular movements are intact  ENT: Tolerates saliva. No trismus. Moist mucous membranes  NECK: Supple. Trachea midline. No meningismus  CARDIO: RRR. Radial pulse 2+. No murmurs rubs or gallops appreciated  LUNGS: Respirations unlabored. CTAB. No accessory muscle usage noted. No wheezes rales rhonchi or stridor. ABDOMEN: Soft. Non-distended. Non-tender. No tenderness in right upper quadrant or right lower quadrant to deep palpation  EXTREMITIES: No acute deformities. No unilateral leg swelling or tenderness behind either one of calves  SKIN: Warm and dry. No erythema edema or rashes appreciated  NEUROLOGICAL:  Cranial nerves II through XII grossly intact. No gross facial drooping. Moves all 4 extremities spontaneously. PSYCHIATRIC: Normal mood. Alert and oriented x3. No reported active suicidality or homicidality. Patient endorsing paranoid ideation that there are people who are out to kill him.     Diagnostics   Labs:  Results for orders placed or performed during the hospital encounter of 02/15/22   COVID-19, Rapid    Specimen: Nasopharyngeal   Result Value Ref Range    Source THROAT     SARS-CoV-2, NAAT DETECTED (A) NOT DETECTED   CBC Auto Differential   Result Value Ref Range    WBC 9.0 4.0 - 10.5 K/CU MM    RBC 4.95 4.6 - 6.2 M/CU MM    Hemoglobin 14.8 13.5 - 18.0 GM/DL    Hematocrit 41.8 (L) 42 - 52 %    MCV 84.4 78 - 100 FL    MCH 29.9 27 - 31 PG    MCHC 35.4 32.0 - 36.0 %    RDW 12.1 11.7 - 14.9 %    Platelets 958 326 - 681 K/CU MM    MPV 9.2 7.5 - 11.1 FL    Bands Relative 2 (L) 5 - 11 %    Segs Relative 70.0 (H) 36 - 66 %    Eosinophils % 1.0 0 - 3 %    Lymphocytes % 10.0 (L) 24 - 44 %    Monocytes % 17.0 (H) 0 - 4 %    Bands Absolute 0.18 K/CU MM    Segs Absolute 6.3 K/CU MM    Eosinophils Absolute 0.1 K/CU MM    Lymphocytes abuse.  There is no evidence of trauma noted on examination patient had initially also complained of constipation but is he does report that he did have bowel movement this morning and his abdomen feels significant better at this time. I do not appreciate any evidence of focal tenderness during deep palpation in all 4 quadrants. I did review patient's imaging studies and laboratory work as noted above. Slightly low sodium of 121 but is not symptomatic. He did receive a liter of IV fluids. I do believe that the patient can have his laboratory work monitored as an outpatient    Patient was seen and evaluated by mental health crisis. They feel that patient would benefit from inpatient psychiatric evaluation treatment. No beds were available across the street. So patient will need to be placed under involuntary commitment paperwork with referrals to hospitals in [facility]. Patient is Covid positive. I did sign the patient's involuntary commitment paperwork. Currently awaiting placement for further evaluation and treatment. ED Medication Orders (From admission, onward)    Start Ordered     Status Ordering Provider    02/15/22 1800 02/15/22 1752  0.9 % sodium chloride bolus  ONCE         Last MAR action: New Bag - by Bharat Owens on 02/15/22 at [facility]          Final Impression      1. Paranoid ideation ([address]    2. Hyponatremia    3. COVID-19      DISPOSITION           This patient was cared for in the setting of the COVID-19 pandemic, with nationwide stress on resources and staffing.     (Please note that portions of this note may have been completed with a voice recognition program. Efforts were made to edit the dictations but occasionally words are mis-transcribed.)    Donetta Dakin, MD  [address] [facility]       Donetta Dakin, MD  02/15/22 Burley Kiss Donetta Dakin, MD  02/15/22 2049

## 2022-02-16 PROBLEM — F25.9 ACUTE EXACERBATION OF CHRONIC SCHIZOAFFECTIVE SCHIZOPHRENIA (HCC): Chronic | Status: ACTIVE | Noted: 2021-08-10

## 2022-02-16 LAB
ALBUMIN SERPL-MCNC: 3.9 GM/DL (ref 3.4–5)
ALP BLD-CCNC: 89 IU/L (ref 40–129)
ALT SERPL-CCNC: 43 U/L (ref 10–40)
ANION GAP SERPL CALCULATED.3IONS-SCNC: 14 MMOL/L (ref 4–16)
AST SERPL-CCNC: 39 IU/L (ref 15–37)
BILIRUB SERPL-MCNC: 0.4 MG/DL (ref 0–1)
BUN BLDV-MCNC: 6 MG/DL (ref 6–23)
CALCIUM SERPL-MCNC: 8.3 MG/DL (ref 8.3–10.6)
CHLORIDE BLD-SCNC: 89 MMOL/L (ref 99–110)
CO2: 20 MMOL/L (ref 21–32)
CREAT SERPL-MCNC: 0.5 MG/DL (ref 0.9–1.3)
GFR AFRICAN AMERICAN: >60 ML/MIN/1.73M2
GFR NON-AFRICAN AMERICAN: >60 ML/MIN/1.73M2
GLUCOSE BLD-MCNC: 85 MG/DL (ref 70–99)
HIGH SENSITIVE C-REACTIVE PROTEIN: 13 MG/L
POTASSIUM SERPL-SCNC: 3.9 MMOL/L (ref 3.5–5.1)
PROCALCITONIN: 0.16
SODIUM BLD-SCNC: 123 MMOL/L (ref 135–145)
SODIUM BLD-SCNC: 126 MMOL/L (ref 135–145)
SODIUM BLD-SCNC: 128 MMOL/L (ref 135–145)
TOTAL PROTEIN: 6 GM/DL (ref 6.4–8.2)

## 2022-02-16 PROCEDURE — 6370000000 HC RX 637 (ALT 250 FOR IP): Performed by: NURSE PRACTITIONER

## 2022-02-16 PROCEDURE — 80053 COMPREHEN METABOLIC PANEL: CPT

## 2022-02-16 PROCEDURE — 86141 C-REACTIVE PROTEIN HS: CPT

## 2022-02-16 PROCEDURE — 84295 ASSAY OF SERUM SODIUM: CPT

## 2022-02-16 PROCEDURE — 84145 PROCALCITONIN (PCT): CPT

## 2022-02-16 PROCEDURE — 36415 COLL VENOUS BLD VENIPUNCTURE: CPT

## 2022-02-16 PROCEDURE — 99222 1ST HOSP IP/OBS MODERATE 55: CPT | Performed by: NURSE PRACTITIONER

## 2022-02-16 PROCEDURE — 1200000000 HC SEMI PRIVATE

## 2022-02-16 PROCEDURE — 80048 BASIC METABOLIC PNL TOTAL CA: CPT

## 2022-02-16 RX ADMIN — BENZTROPINE MESYLATE 1 MG: 1 TABLET ORAL at 00:39

## 2022-02-16 RX ADMIN — MONTELUKAST 10 MG: 10 TABLET, FILM COATED ORAL at 20:47

## 2022-02-16 RX ADMIN — DIVALPROEX SODIUM 500 MG: 500 TABLET, DELAYED RELEASE ORAL at 14:11

## 2022-02-16 RX ADMIN — DIVALPROEX SODIUM 500 MG: 500 TABLET, DELAYED RELEASE ORAL at 00:39

## 2022-02-16 RX ADMIN — MONTELUKAST 10 MG: 10 TABLET, FILM COATED ORAL at 00:39

## 2022-02-16 RX ADMIN — RISPERIDONE 2 MG: 2 TABLET ORAL at 00:39

## 2022-02-16 RX ADMIN — DIVALPROEX SODIUM 500 MG: 500 TABLET, DELAYED RELEASE ORAL at 20:47

## 2022-02-16 RX ADMIN — BENZTROPINE MESYLATE 1 MG: 1 TABLET ORAL at 20:48

## 2022-02-16 RX ADMIN — RISPERIDONE 2 MG: 2 TABLET ORAL at 20:48

## 2022-02-16 ASSESSMENT — PAIN SCALES - GENERAL: PAINLEVEL_OUTOF10: 0

## 2022-02-16 NOTE — PROGRESS NOTES
Patient seen and examined reviewed with above. Noted to have hyponatremia as well as having Covid positive. Combination of possible dehydration plus medications he is taking.   Agree with IV fluids and hydration and monitor for now repeat labs full note to follow

## 2022-02-16 NOTE — ED NOTES
Called [facility] stated isolation unit full, called [facility] bed available, chart faxed for review      Randi Hardy RN  02/15/22 2011

## 2022-02-16 NOTE — ED NOTES
Called [facility] explained covid result, stated they accept on a case by case basis, chart faxed for review      Prabha Everett RN  02/15/22 2316

## 2022-02-16 NOTE — PROGRESS NOTES
Pt transferred to room 2007 per suicide precaution policy. All cords, trash cans and hazardous materials removed from room per Suicide prevention guidelines. Appropriate suicide checklist performed and signed. Pt's personal belongings and phone placed in locked cabinet in room. Pt oriented to the room. Lesley Bejarano RN given update/ report on transfer to different room. Sitter at bedside.

## 2022-02-16 NOTE — CONSULTS
Initial Psychiatric Consult    Rip Cynthia Marker  [id number]  2/15/2022  02/16/22    ID: Patient is a 59 yrs y.o. male    CC: \"I wasn't getting any sleep. \"    HPI: Maru Everett is a 59 y.o.  male  who presents with paranoid delusions. Patient states he is concerned people are out to kill him. Patient is uncertain exactly how long he has been feeling this way because report this is happened in the past.  Denies any recent changes in diet. Upon further questioning patient states he stopped taking his home medications about 5 to 7 days ago. Denies any thoughts of hurting others but states he has thought about hurting himself. He cannot say exactly why he feels that people are trying to hurt him but he feels that he is in danger at this time. During today's interview the patient was alert & oriented to self, city and month. He was disoriented to year (2021). He denies SI/HI and AVH. He voices the delusion that there are people who want to kill him. He also endorses depression and anxiety as \"10\" on a scale of 0 to 10 with 0 being none and 10 being horrible. Notes that his sleep has been very poor. States that his appetite is \"not very good. \" He denies any suicide attempts. Notes that he has been hospitalized in the past on an inpatient psychiatric unit. States that he sees Dr. Jerzy Sifuentes as an outpatient. He was pleasant and cooperative throughout the interview. Past Psychiatric History:   Schizoaffective schizophrenia    Family Psychiatric History:   History reviewed. No pertinent family history.      Allergies:  No Known Allergies     OBJECTIVE  Vital Signs:  Vitals:    02/16/22 1310   BP: (!) 144/89   Pulse:    Resp:    Temp:    SpO2:        Labs:  Recent Results (from the past 48 hour(s))   CBC Auto Differential    Collection Time: 02/15/22  3:52 PM   Result Value Ref Range    WBC 9.0 4.0 - 10.5 K/CU MM    RBC 4.95 4.6 - 6.2 M/CU MM    Hemoglobin 14.8 13.5 - 18.0 GM/DL    Hematocrit 41.8 (L) 42 - 52 %    MCV 84.4 78 - 100 FL    MCH 29.9 27 - 31 PG    MCHC 35.4 32.0 - 36.0 %    RDW 12.1 11.7 - 14.9 %    Platelets 772 061 - 625 K/CU MM    MPV 9.2 7.5 - 11.1 FL    Bands Relative 2 (L) 5 - 11 %    Segs Relative 70.0 (H) 36 - 66 %    Eosinophils % 1.0 0 - 3 %    Lymphocytes % 10.0 (L) 24 - 44 %    Monocytes % 17.0 (H) 0 - 4 %    Bands Absolute 0.18 K/CU MM    Segs Absolute 6.3 K/CU MM    Eosinophils Absolute 0.1 K/CU MM    Lymphocytes Absolute 0.9 K/CU MM    Monocytes Absolute 1.5 K/CU MM    Differential Type MANUAL DIFFERENTIAL     Chito Cells 1+    Comprehensive Metabolic Panel w/ Reflex to MG    Collection Time: 02/15/22  3:52 PM   Result Value Ref Range    Sodium 121 (L) 135 - 145 MMOL/L    Potassium 3.4 (L) 3.5 - 5.1 MMOL/L    Chloride 86 (L) 99 - 110 mMol/L    CO2 24 21 - 32 MMOL/L    BUN 11 6 - 23 MG/DL    CREATININE 0.7 (L) 0.9 - 1.3 MG/DL    Glucose 113 (H) 70 - 99 MG/DL    Calcium 8.5 8.3 - 10.6 MG/DL    Albumin 4.0 3.4 - 5.0 GM/DL    Total Protein 6.4 6.4 - 8.2 GM/DL    Total Bilirubin 0.4 0.0 - 1.0 MG/DL    ALT 24 10 - 40 U/L    AST 26 15 - 37 IU/L    Alkaline Phosphatase 85 40 - 128 IU/L    GFR Non-African American >60 >60 mL/min/1.73m2    GFR African American >60 >60 mL/min/1.73m2    Anion Gap 11 4 - 16   Ethanol    Collection Time: 02/15/22  3:52 PM   Result Value Ref Range    Alcohol Scrn <0.01 <1.28 %WT/VOL   Salicylate Level    Collection Time: 02/15/22  3:52 PM   Result Value Ref Range    Salicylate Lvl <2.5 (L) 15 - 30 MG/DL    DOSE AMOUNT DOSE AMT. GIVEN - UNKNOWN     DOSE TIME DOSE TIME GIVEN - UNKNOWN    Acetaminophen level    Collection Time: 02/15/22  3:52 PM   Result Value Ref Range    Acetaminophen Level <5.0 (L) 15 - 30 ug/ml    DOSE AMOUNT DOSE AMT.  GIVEN - UNKNOWN     DOSE TIME DOSE TIME GIVEN - UNKNOWN    Magnesium    Collection Time: 02/15/22  3:52 PM   Result Value Ref Range    Magnesium 1.7 (L) 1.8 - 2.4 mg/dl   COVID-19, Rapid    Collection Time: 02/15/22  5:40 PM    Specimen: Nasopharyngeal Result Value Ref Range    Source THROAT     SARS-CoV-2, NAAT DETECTED (A) NOT DETECTED   Urine Drug Screen    Collection Time: 02/15/22  5:43 PM   Result Value Ref Range    Cannabinoid Scrn, Ur NEGATIVE NEGATIVE    Amphetamines NEGATIVE NEGATIVE    Cocaine Metabolite NEGATIVE NEGATIVE    Benzodiazepine Screen, Urine NEGATIVE NEGATIVE    Barbiturate Screen, Ur NEGATIVE NEGATIVE    Opiates, Urine NEGATIVE NEGATIVE    Phencyclidine, Urine NEGATIVE NEGATIVE    Oxycodone NEGATIVE NEGATIVE   Urinalysis    Collection Time: 02/15/22  5:43 PM   Result Value Ref Range    Color, UA YELLOW YELLOW    Clarity, UA SLIGHTLY CLOUDY (A) CLEAR    Glucose, Urine NEGATIVE NEGATIVE MG/DL    Bilirubin Urine NEGATIVE NEGATIVE MG/DL    Ketones, Urine TRACE (A) NEGATIVE MG/DL    Specific Gravity, UA 1.020 1.001 - 1.035    Blood, Urine SMALL (A) NEGATIVE    pH, Urine 6.0 5.0 - 8.0    Protein, UA TRACE (A) NEGATIVE MG/DL    Urobilinogen, Urine 2.0 (H) 0.2 - 1.0 MG/DL    Nitrite Urine, Quantitative NEGATIVE NEGATIVE    Leukocyte Esterase, Urine NEGATIVE NEGATIVE    RBC, UA 2 0 - 3 /HPF    WBC, UA 2 0 - 2 /HPF    Bacteria, UA NEGATIVE NEGATIVE /HPF    Mucus, UA MANY (A) NEGATIVE HPF    Unclassified Cast 12 /LPF   BMP    Collection Time: 02/15/22 10:03 PM   Result Value Ref Range    Sodium 119 (LL) 135 - 145 MMOL/L    Potassium 3.5 3.5 - 5.1 MMOL/L    Chloride 85 (L) 99 - 110 mMol/L    CO2 23 21 - 32 MMOL/L    Anion Gap 11 4 - 16    BUN 8 6 - 23 MG/DL    CREATININE 0.5 (L) 0.9 - 1.3 MG/DL    Glucose 81 70 - 99 MG/DL    Calcium 7.8 (L) 8.3 - 10.6 MG/DL    GFR Non-African American >60 >60 mL/min/1.73m2    GFR African American >60 >60 mL/min/1.73m2   TSH without Reflex    Collection Time: 02/15/22 10:03 PM   Result Value Ref Range    TSH, High Sensitivity 1.270 0.270 - 4.20 uIu/ml   T4, free    Collection Time: 02/15/22 10:03 PM   Result Value Ref Range    T4 Free 1.63 0.9 - 1.8 NG/DL   Valproic acid (DEPAKOTE) level    Collection Time: 02/15/22 10:03 PM   Result Value Ref Range    Valproic Acid Lvl <2.8 (L) 50 - 100 UG/ML    DOSE AMOUNT DOSE AMT. GIVEN - UNKNOWN     DOSE TIME DOSE TIME GIVEN - UNKNOWN    Comprehensive Metabolic Panel w/ Reflex to MG    Collection Time: 02/16/22  6:05 AM   Result Value Ref Range    Sodium 123 (L) 135 - 145 MMOL/L    Potassium 3.9 3.5 - 5.1 MMOL/L    Chloride 89 (L) 99 - 110 mMol/L    CO2 20 (L) 21 - 32 MMOL/L    BUN 6 6 - 23 MG/DL    CREATININE 0.5 (L) 0.9 - 1.3 MG/DL    Glucose 85 70 - 99 MG/DL    Calcium 8.3 8.3 - 10.6 MG/DL    Albumin 3.9 3.4 - 5.0 GM/DL    Total Protein 6.0 (L) 6.4 - 8.2 GM/DL    Total Bilirubin 0.4 0.0 - 1.0 MG/DL    ALT 43 (H) 10 - 40 U/L    AST 39 (H) 15 - 37 IU/L    Alkaline Phosphatase 89 40 - 129 IU/L    GFR Non-African American >60 >60 mL/min/1.73m2    GFR African American >60 >60 mL/min/1.73m2    Anion Gap 14 4 - 16       Review of Systems:  Reports of no current cardiovascular, respiratory, gastrointestinal, genitourinary, integumentary, neurological, muscuoskeletal, or immunological symptoms today. PSYCHIATRIC: See HPI above. PSYCHIATRIC EXAMINATION / MENTAL STATUS EXAM    CONSTITUTIONAL:    Vitals:   Vitals:    02/16/22 1310   BP: (!) 144/89   Pulse:    Resp:    Temp:    SpO2:       General appearance: [x] appears age, []  appears older than stated age,               [x]  adequately dressed and groomed, [] disheveled,               [x]  in no acute distress, [] appears mildly distressed, [] other           MUSCULOSKELETAL:   Gait:   [] normal, [] antalgic, [] unsteady, [x] gait not evaluated   Station:             [] erect, [x] sitting, [] recumbent, [] other        Strength/tone:  [x] muscle strength and tone appear consistent with age and condition     [] atrophy      [] abnormal movements  PSYCHIATRIC:    Appearance: Appears stated age. Alert and oriented to person, place & situation. Disoriented to year (2021) No acute distress. Adequate grooming and hygiene. Fair eye contact.  No prominent physical abnormalities. Attitude: Manner is cooperative and pleasant  Motor: No psychomotor agitation, retardation or abnormal movements noted  Speech: Clearly articulated; normal rate, volume, tone & amount. Language: intact understanding and production  Mood: depressed, anxious  Affect: depressed, decreased range, non-labile, congruent with mood and content of speech  Thought Production: Spontaneous. Thought Form: Coherent, linear, logical & goal-directed. No tangentiality or circumstantiality. No flight of ideas or loosening of associations. Thought Content/Perceptions: No LORRAINE, no AVH, paranoid delusions present  Insight: poor  Judgment: questionable  Memory: Immediate, recent, and remote appear intact, though not formally tested. Attention: maintained throughout interview  Fund of knowledge: Average  Gait/Balance: not assessed    Impression:   Acute exacerbation of chronic schizoaffective disorder    Problem List:   Hyponatremia    Plan:  1. Agree with current medications  2. Once medically stable, patient should be transferred to inpatient psychiatric unit for acute stabilization  3. Will follow    Thank you for the interesting consult.     Electronically signed by CYNTHIA Sahni CNP on 2/16/2022 at 2:24 PM

## 2022-02-16 NOTE — CONSULTS
Nephrology Service Consultation    Patient:  Leroy Alegria  MRN: [id number]  Consulting physician:  Micaela Barragan MD  Reason for Consult: Hyponatremia    History Obtained From:  patient, electronic medical record  PCP: Leslie Ortiz MD    HISTORY OF PRESENT ILLNESS:   The patient is a 59 y.o. male who presents with weakness paranoid delusions concerned about suicidal ideations. The above setting psychiatry is recommended to see was admitted for evaluation therapy noted to have hyponatremia and above setting. Patient tells me needs APs on IV fluids and unable to give more detailed history. Information per chart. Past Medical History:        Diagnosis Date    Anxiety        Past Surgical History:        Procedure Laterality Date    DENTAL SURGERY         Medications:   Scheduled Meds:   risperiDONE  0.5 mg Oral Daily    montelukast  10 mg Oral Nightly    levothyroxine  50 mcg Oral Daily    aspirin EC  81 mg Oral Daily    atorvastatin  10 mg Oral Daily    risperiDONE  2 mg Oral Nightly    divalproex  500 mg Oral TID    pantoprazole  40 mg Oral QAM AC    benztropine  1 mg Oral BID    bisacodyl  5 mg Oral Daily    potassium bicarb-citric acid  40 mEq Oral Once     Continuous Infusions:  PRN Meds:.acetaminophen, polyethylene glycol, nicotine polacrilex, guaiFENesin-dextromethorphan    Allergies:  Patient has no known allergies. Social History:   TOBACCO:   reports that he has been smoking cigarettes. He has been smoking about 0.50 packs per day. He has never used smokeless tobacco.  ETOH:   reports current alcohol use. OCCUPATION:      Family History:   History reviewed. No pertinent family history. REVIEW OF SYSTEMS:  Negative except for weak anxious paranoid. Physical Exam:    I/O: No intake/output data recorded.     Vitals: BP (!) 144/89   Pulse 84   Temp 98.1 °F (36.7 °C) (Oral)   Resp 16   Ht 5' 11\" (1.803 m)   Wt 251 lb (113.9 kg)   SpO2 94%   BMI 35.01 kg/m² General appearance: awake weak  HEENT: Head: Normal, normocephalic, atraumatic. Neck: supple, symmetrical, trachea midline  Lungs: diminished breath sounds bilaterally  Heart: S1, S2 normal  Abdomen: abnormal findings:  soft NT  Extremities: edema trace  Neurologic: Mental status: alertness: Awake      CBC:   Recent Labs     02/15/22  1552   WBC 9.0   HGB 14.8        BMP:    Recent Labs     02/15/22  1552 02/15/22  1552 02/15/22  2203 02/16/22  0605 02/16/22  1432   *   < > 119* 123* 126*   K 3.4*  --  3.5 3.9  --    CL 86*  --  85* 89*  --    CO2 24  --  23 20*  --    BUN 11  --  8 6  --    CREATININE 0.7*  --  0.5* 0.5*  --    GLUCOSE 113*  --  81 85  --     < > = values in this interval not displayed. Hepatic:   Recent Labs     02/15/22  1552 02/16/22  0605   AST 26 39*   ALT 24 43*   BILITOT 0.4 0.4   ALKPHOS 85 89     Troponin: No results for input(s): TROPONINI in the last 72 hours. BNP: No results for input(s): BNP in the last 72 hours. Lipids: No results for input(s): CHOL, HDL in the last 72 hours. Invalid input(s): LDLCALCU  ABGs: No results found for: PHART, PO2ART, KNH0YBG  INR: No results for input(s): INR in the last 72 hours.   Renal Labs  Albumin:    Lab Results   Component Value Date    LABALBU 3.9 02/16/2022     Calcium:    Lab Results   Component Value Date    CALCIUM 8.3 02/16/2022     Phosphorus:  No results found for: PHOS  U/A:    Lab Results   Component Value Date    NITRU NEGATIVE 02/15/2022    COLORU YELLOW 02/15/2022    WBCUA 2 02/15/2022    RBCUA 2 02/15/2022    MUCUS MANY 02/15/2022    BACTERIA NEGATIVE 02/15/2022    CLARITYU SLIGHTLY CLOUDY 02/15/2022    SPECGRAV 1.020 02/15/2022    UROBILINOGEN 2.0 02/15/2022    BILIRUBINUR NEGATIVE 02/15/2022    BLOODU SMALL 02/15/2022    KETUA TRACE 02/15/2022     ABG:  No results found for: PHART, ZAC3DJQ, PO2ART, QMS7PNW, BEART, THGBART, BQC6IZJ, Z5EORTAJ  HgBA1c:    Lab Results   Component Value Date    LABA1C 6.0 11/08/2021     Microalbumen/Creatinine ratio:  No components found for: RUCREAT  TSH:    Lab Results   Component Value Date    TSH 1.25 11/08/2021     IRON:  No results found for: IRON  Iron Saturation:  No components found for: PERCENTFE  TIBC:  No results found for: TIBC  FERRITIN:  No results found for: FERRITIN  RPR:  No results found for: RPR  RAYRAY:  No results found for: ANATITER, RAYRAY  24 Hour Urine for Creatinine Clearance:  No components found for: CREAT4, UHRS10, UTV10  -----------------------------------------------------------------      Assessment and Recommendations     Patient Active Problem List   Diagnosis Code    Acquired hypothyroidism E03.9    Mild intermittent asthma without complication R82.86    Acute exacerbation of chronic schizoaffective schizophrenia (HCC) F25.9    Tobacco use disorder F17.200    Prediabetes R73.03    Mixed hyperlipidemia E78.2    Hordeolum externum of right upper eyelid H00.011    Hyponatremia E87.1     Impression plan  #1 paranoid schizophrenia  #2 hyponatremia  #3 hypertension  #4 hypokalemia    Plan  #1 follow-up psychiatry and plan on inpatient psych when medically stable  #2 sodium better with IV fluids and hydration will monitor maintain with sodium greater than 130 can discharge to inpatient psych  #3 blood pressure monitor likely increased due to anxiety  #4 replete potassium if needed will monitor for now  We will follow  Electronically signed by Genaro Montiel MD on 2/16/2022 at 4:41 PM

## 2022-02-16 NOTE — ED NOTES
Report from [facility]. Care assumed at this time. No needs expressed at this time.       Estefanía Canas RN  02/16/22 6832

## 2022-02-16 NOTE — ED NOTES
Updated [facility] of ETA    nurse to nurse will need to be called to [id number], RN  02/15/22 1261

## 2022-02-16 NOTE — ED NOTES
Spoke with Clarisse Marie [facility]  [phone]     Informed then that patient has a low sodium level and will not be admitted to their facility. As per Enoch Ware, just resend lab results and other documents when patient is medically cleared and they will readmit this patient.       Mell Ha RN  02/16/22 8669

## 2022-02-16 NOTE — ED NOTES
Pt accepted, pink slip faxed to [facility] all stated no availability     Rashida You RN  02/15/22 2121      [facility] assisting with transport, once obtained nurse to nurse will need to be called to [address], RN  02/15/22 5001

## 2022-02-16 NOTE — PROGRESS NOTES
Pt pleasant with staff,intermittenly C/O delusions but made no effort to act on them, willing to listen to staff talk with him to help calm him,will cont to monitor,

## 2022-02-16 NOTE — H&P
History and Physical      Name:  Camden Alegria /Age/Sex: 1965  (59 y.o. male)   MRN & CSN:  [id number] & [id number] Admission Date/Time: 2/15/2022  1:34 PM   Location:  [facility] PCP: Shannon Sebastian MD       Hospital Day: 1    Assessment and Plan:   Riana Casas is a 59 y.o.  male  who presents with paranoid delusions,    Mental health problem  Paranoid delusions   Endorses paranoid delusions, intermittent expressed delusions of suicidal thoughts   Placed in suicide precautions with continue sitter at bedside, elopement precautions   Consult to psychiatry for evaluation   Mental health consult for placement at discharge   Resume home medications including Risperdal and cogentin. Patient reports he self terminated medications approximately 1 week ago. Urine drug screen is negative and patient agreeable to resuming medications   Check Depakote level  COVID-19   Incidental finding on work-up for mental health placement, patient denies symptoms     Droplet plus precaution    -Patient does not require respiratory support with oxygen so no need for steroids or antivirals at this  time   Guaifenesin as needed      Hyponatremia  Hypokalemia  Hypomagnesemia   Received 2 g of magnesium x1 in ED   50 mEq K-Lyte x1   Received 1 L 0.9 fluid bolus in ED. Start maintenance IV 0.9 normal saline at 75 cc/h x 10 hours. Repeat BMP in a.m. Chronic Conditions: continue home medication as ordered  BMI: Body mass index is 35.01 kg/m². Life style modifications  Tobacco dependence: Smokes 0.5 PPD/day, nicotine lozenges as needed patient was counseled on tobacco cessation. All testing  and results reviewed with patient . All questions answered. Patient and family voiced understanding    This patient was seen and examined in conjunction with Dr. Zoe Trujillo. He/She was agreeable with the plan and management as dictated above. Diet ADULT DIET;  Regular   DVT Prophylaxis [] Lovenox, []  Heparin, [] SCDs, [x] Ambulation  [] NOAC   GI Prophylaxis [] PPI,  [] H2 Blocker,  [] Carafate,  [x] Diet/Tube Feeds   Code Status Full Code   Disposition Patient requires continued admission due to paranoid delusions with hyponatremia   MDM [] Low, [x] Moderate,[]  High       History of Present Illness:     Chief Complaint:   Nishant Lacy is a 59 y.o.  male  who presents with paranoid delusions. Patient states he is concerned people are out to kill him. Patient is uncertain exactly how long he has been feeling this way because report this is happened in the past.  Denies any recent changes in diet. Upon further questioning patient states he stopped taking his home medications about 5 to 7 days ago. Denies any thoughts of hurting others but states he has thought about hurting himself. He cannot say exactly why he feels that people are trying to hurt him but he feels that he is in danger at this time. I discussed this patient with the ED provider and Dr. Lexy Eason. I did a review of patient's medical records, lab results and imaging conducted today. I personally reviewed patient's vital signs including     Ten point ROS reviewed negative, unless as noted above    Objective:   No intake or output data in the 24 hours ending 02/15/22 2232     Labs: Recent Labs     02/15/22  1552   *   K 3.4*   CL 86*   CO2 24   BUN 11   CREATININE 0.7*   WBC 9.0   HCT 41.8*          Imaging:  No image results found. Vitals:   Vitals:    02/15/22 1831   BP: 138/88   Pulse: 85   Resp: 18   Temp:    SpO2: 97%     Physical Exam:   GEN Awake male, sitting upright in bed in no apparent distress. Appears given age. EYES Pupils are equally round. No scleral erythema, discharge, or conjunctivitis. HENT Mucous membranes are moist.  NECK Supple, no apparent thyromegaly or masses. RESP Clear to auscultation, no wheezes, rales or rhonchi. Respirations even and unlabored on RA. CARDIO/VASC   S1/S2 auscultated.  Regular rate without appreciable murmurs, rubs, or gallops. Peripheral pulses equal bilaterally and palpable. No peripheral edema. GI Abdomen is soft without significant tenderness, masses, or guarding. Bowel sounds are normoactive.  No costovertebral angle tenderness. Salgado catheter is not present. MSK No gross joint deformities. SKIN Normal coloration, warm, dry. NEURO Cranial nerves appear grossly intact, normal speech, no lateralizing weakness. PSYCH Awake, alert, oriented x 4. Affect appropriate. Past Medical History:      Past Medical History:   Diagnosis Date    Anxiety      PSHX:  has a past surgical history that includes Dental surgery. Allergies: No Known Allergies    FAM HX: family history is not on file. Soc HX:   Social History     Socioeconomic History    Marital status: Single     Spouse name: None    Number of children: None    Years of education: None    Highest education level: None   Occupational History    None   Tobacco Use    Smoking status: Current Every Day Smoker     Packs/day: 0.50     Types: Cigarettes    Smokeless tobacco: Never Used   Substance and Sexual Activity    Alcohol use: Yes    Drug use: No    Sexual activity: Yes     Partners: Female   Other Topics Concern    None   Social History Narrative    None     Social Determinants of Health     Financial Resource Strain:     Difficulty of Paying Living Expenses: Not on file   Food Insecurity:     Worried About Running Out of Food in the Last Year: Not on file    Vini of Food in the Last Year: Not on file   Transportation Needs:     Lack of Transportation (Medical): Not on file    Lack of Transportation (Non-Medical):  Not on file   Physical Activity:     Days of Exercise per Week: Not on file    Minutes of Exercise per Session: Not on file   Stress:     Feeling of Stress : Not on file   Social Connections:     Frequency of Communication with Friends and Family: Not on file    Frequency of Social Gatherings with Friends and Family: Not on file    Attends Christian Services: Not on file    Active Member of Clubs or Organizations: Not on file    Attends Club or Organization Meetings: Not on file    Marital Status: Not on file   Intimate Partner Violence:     Fear of Current or Ex-Partner: Not on file    Emotionally Abused: Not on file    Physically Abused: Not on file    Sexually Abused: Not on file   Housing Stability:     Unable to Pay for Housing in the Last Year: Not on file    Number of Places Lived in the Last Year: Not on file    Unstable Housing in the Last Year: Not on file       Medications:   Medications:    magnesium sulfate  2,000 mg IntraVENous Once    [START ON 2/16/2022] risperiDONE  0.5 mg Oral Daily    montelukast  10 mg Oral Nightly    [START ON 2/16/2022] levothyroxine  50 mcg Oral Daily    [START ON 2/16/2022] aspirin EC  81 mg Oral Daily    [START ON 2/16/2022] atorvastatin  10 mg Oral Daily    risperiDONE  2 mg Oral Nightly    divalproex  500 mg Oral TID    [START ON 2/16/2022] pantoprazole  40 mg Oral QAM AC    benztropine  1 mg Oral BID    [START ON 2/16/2022] bisacodyl  5 mg Oral Daily      Infusions:    sodium chloride       PRN Meds: acetaminophen, 650 mg, Q4H PRN  polyethylene glycol, 17 g, Daily PRN  nicotine polacrilex, 2 mg, Q1H PRN  guaiFENesin-dextromethorphan, 5 mL, Q4H PRN          Electronically signed by CYNTHIA Boles CNP on 2/15/2022 at 10:32 PM      This dictation was created with voice recognition software. While attempts have been made to review the dictation as it is transcribed, on occasion the spoken word can be misinterpreted by the technology leading to omissions or inappropriate words, phrases or sentences.

## 2022-02-16 NOTE — PROGRESS NOTES
[facility] HOSPITALIST PROGRESS NOTE      PCP: Leslie Ortiz MD    Date of Admission: 2/15/2022    Subjective: Feels like he can be discharged    [address] [facility] summary patient is a 58-year-old male with history of schizophrenia, COVID-19 who presented to the ER with paranoid delusion.  Patient was suicidal, bedside sitter ordered, mental health consulted  Was positive for Covid,    Vitals signs:  Afebrile, blood pressure mild elevated, heart rate 80s range, on room air    Medications: Aspirin, Lipitor, benztropine, divalproex, levothyroxine, montelukast, pantoprazole, risperidone    Antibiotics: None    Fluid status: Not documented    Labs:   Sodium 123, potassium 3.9, chloride 89, bicarb 20, creatinine 0.5  LFTs normal  SARS-CoV-2 positive  Urine negative for cystitis      Imaging:   Chest x-ray shows subtle perihilar infiltrate within the left lower lung consistent with COVID-19 pneumonia     Assessment/Plan:     Hyponatremia: Hypotonic hyponatremia  TSH normal  Sodium 119 on admission, improved with hydration of 123  Continue IV hydration  Check urine sodium and urine osmolarity  Consult nephrology if worsens  Check sodium every 6    COVID-19 pneumonia: Positive for Covid, chest x-ray shows perihilar infiltrate left lower lobe  Check procalcitonin and CRP  Currently on room air    Paranoid delusions  Suicidal ideation: Admitted with suicidal ideation, bedside sitter in place  Consult psych once medically stable  Continue risperidone and divalproex for now  Has a bed available at psych facility but sodium has improved before he can be discharged    Hyperlipidemia:  Statin    Hypothyroidism: Levothyroxine    DVT prophlaxis:   Lovenox    Last BM:   As needed laxative    Ambulation:   As tolerated    Disposition:   Home    Diet:     Physical Exam Performed:       BP (!) 156/101   Pulse 84   Temp 98.1 °F (36.7 °C) (Oral)   Resp 16   Ht 5' 11\" (1.803 m)   Wt 251 lb (113.9 kg)   SpO2 94%   BMI 35.01 kg/m²     Physical Exam  Constitutional:       General: He is not in acute distress. Appearance: Normal appearance. HENT:      Head: Normocephalic and atraumatic. Right Ear: External ear normal.      Left Ear: External ear normal.   Eyes:      Extraocular Movements: Extraocular movements intact. Pupils: Pupils are equal, round, and reactive to light. Cardiovascular:      Rate and Rhythm: Normal rate and regular rhythm. Heart sounds: No murmur heard. Pulmonary:      Effort: Pulmonary effort is normal. No respiratory distress. Breath sounds: Normal breath sounds. No wheezing. Abdominal:      General: Bowel sounds are normal. There is no distension. Palpations: Abdomen is soft. Tenderness: There is no abdominal tenderness. Musculoskeletal:         General: No swelling. Cervical back: Normal range of motion. Skin:     General: Skin is warm. Neurological:      General: No focal deficit present. Mental Status: He is alert and oriented to person, place, and time. Cranial Nerves: No cranial nerve deficit. Psychiatric:         Mood and Affect: Mood normal.         Labs:   Recent Labs     02/15/22  1552   WBC 9.0   HGB 14.8   HCT 41.8*        Recent Labs     02/15/22  1552 02/15/22  2203 02/16/22  0605   * 119* 123*   K 3.4* 3.5 3.9   CL 86* 85* 89*   CO2 24 23 20*   BUN 11 8 6   CREATININE 0.7* 0.5* 0.5*   CALCIUM 8.5 7.8* 8.3     Recent Labs     02/15/22  1552 02/16/22  0605   AST 26 39*   ALT 24 43*   BILITOT 0.4 0.4   ALKPHOS 85 89     No results for input(s): INR in the last 72 hours. No results for input(s): Mick Grates in the last 72 hours.     Urinalysis:      Lab Results   Component Value Date    NITRU NEGATIVE 02/15/2022    WBCUA 2 02/15/2022    BACTERIA NEGATIVE 02/15/2022    RBCUA 2 02/15/2022    BLOODU SMALL 02/15/2022    SPECGRAV 1.020 02/15/2022       Radiology:  XR CHEST PORTABLE   Final Result   Subtle peripheral infiltrates are suggested within the left lower lung. If   these are real finding, it may reflect pneumonia, including COVID-19   pneumonia.                  Debra Lora MD  2/16/2022 9:44 AM

## 2022-02-17 LAB
ANION GAP SERPL CALCULATED.3IONS-SCNC: 8 MMOL/L (ref 4–16)
BASOPHILS ABSOLUTE: 0.1 K/CU MM
BASOPHILS RELATIVE PERCENT: 1 % (ref 0–1)
BUN BLDV-MCNC: 7 MG/DL (ref 6–23)
CALCIUM SERPL-MCNC: 8.7 MG/DL (ref 8.3–10.6)
CHLORIDE BLD-SCNC: 93 MMOL/L (ref 99–110)
CO2: 28 MMOL/L (ref 21–32)
CREAT SERPL-MCNC: 0.8 MG/DL (ref 0.9–1.3)
DIFFERENTIAL TYPE: ABNORMAL
EOSINOPHILS ABSOLUTE: 0 K/CU MM
EOSINOPHILS RELATIVE PERCENT: 0.5 % (ref 0–3)
GFR AFRICAN AMERICAN: >60 ML/MIN/1.73M2
GFR NON-AFRICAN AMERICAN: >60 ML/MIN/1.73M2
GLUCOSE BLD-MCNC: 92 MG/DL (ref 70–99)
HCT VFR BLD CALC: 45.6 % (ref 42–52)
HEMOGLOBIN: 15.5 GM/DL (ref 13.5–18)
IMMATURE NEUTROPHIL %: 0.5 % (ref 0–0.43)
LYMPHOCYTES ABSOLUTE: 1.9 K/CU MM
LYMPHOCYTES RELATIVE PERCENT: 25.5 % (ref 24–44)
MCH RBC QN AUTO: 29.6 PG (ref 27–31)
MCHC RBC AUTO-ENTMCNC: 34 % (ref 32–36)
MCV RBC AUTO: 87.2 FL (ref 78–100)
MONOCYTES ABSOLUTE: 1.5 K/CU MM
MONOCYTES RELATIVE PERCENT: 21.1 % (ref 0–4)
NUCLEATED RBC %: 0 %
PDW BLD-RTO: 12.6 % (ref 11.7–14.9)
PLATELET # BLD: 294 K/CU MM (ref 140–440)
PMV BLD AUTO: 8.7 FL (ref 7.5–11.1)
POTASSIUM SERPL-SCNC: 4.1 MMOL/L (ref 3.5–5.1)
RBC # BLD: 5.23 M/CU MM (ref 4.6–6.2)
SEGMENTED NEUTROPHILS ABSOLUTE COUNT: 3.8 K/CU MM
SEGMENTED NEUTROPHILS RELATIVE PERCENT: 51.4 % (ref 36–66)
SODIUM BLD-SCNC: 129 MMOL/L (ref 135–145)
SODIUM BLD-SCNC: 129 MMOL/L (ref 135–145)
SODIUM BLD-SCNC: 132 MMOL/L (ref 135–145)
TOTAL IMMATURE NEUTOROPHIL: 0.04 K/CU MM
TOTAL NUCLEATED RBC: 0 K/CU MM
WBC # BLD: 7.3 K/CU MM (ref 4–10.5)

## 2022-02-17 PROCEDURE — 80048 BASIC METABOLIC PNL TOTAL CA: CPT

## 2022-02-17 PROCEDURE — 6370000000 HC RX 637 (ALT 250 FOR IP): Performed by: NURSE PRACTITIONER

## 2022-02-17 PROCEDURE — 85025 COMPLETE CBC W/AUTO DIFF WBC: CPT

## 2022-02-17 PROCEDURE — 84295 ASSAY OF SERUM SODIUM: CPT

## 2022-02-17 PROCEDURE — 1200000000 HC SEMI PRIVATE

## 2022-02-17 PROCEDURE — 94761 N-INVAS EAR/PLS OXIMETRY MLT: CPT

## 2022-02-17 PROCEDURE — 36415 COLL VENOUS BLD VENIPUNCTURE: CPT

## 2022-02-17 RX ADMIN — ASPIRIN 81 MG: 81 TABLET, COATED ORAL at 08:38

## 2022-02-17 RX ADMIN — DIVALPROEX SODIUM 500 MG: 500 TABLET, DELAYED RELEASE ORAL at 12:53

## 2022-02-17 RX ADMIN — PANTOPRAZOLE SODIUM 40 MG: 40 TABLET, DELAYED RELEASE ORAL at 05:34

## 2022-02-17 RX ADMIN — RISPERIDONE 0.5 MG: 0.5 TABLET ORAL at 08:39

## 2022-02-17 RX ADMIN — LEVOTHYROXINE SODIUM 50 MCG: 0.05 TABLET ORAL at 05:34

## 2022-02-17 RX ADMIN — ATORVASTATIN CALCIUM 10 MG: 10 TABLET, FILM COATED ORAL at 08:38

## 2022-02-17 RX ADMIN — MONTELUKAST 10 MG: 10 TABLET, FILM COATED ORAL at 21:37

## 2022-02-17 RX ADMIN — DIVALPROEX SODIUM 500 MG: 500 TABLET, DELAYED RELEASE ORAL at 08:38

## 2022-02-17 RX ADMIN — BENZTROPINE MESYLATE 1 MG: 1 TABLET ORAL at 08:38

## 2022-02-17 RX ADMIN — BENZTROPINE MESYLATE 1 MG: 1 TABLET ORAL at 21:37

## 2022-02-17 RX ADMIN — BISACODYL 5 MG: 5 TABLET, COATED ORAL at 08:38

## 2022-02-17 RX ADMIN — RISPERIDONE 0.5 MG: 0.5 TABLET ORAL at 08:37

## 2022-02-17 RX ADMIN — DIVALPROEX SODIUM 500 MG: 500 TABLET, DELAYED RELEASE ORAL at 21:37

## 2022-02-17 RX ADMIN — RISPERIDONE 2 MG: 2 TABLET ORAL at 21:37

## 2022-02-17 ASSESSMENT — PAIN SCALES - GENERAL
PAINLEVEL_OUTOF10: 0

## 2022-02-17 NOTE — DISCHARGE SUMMARY
[facility]    Patient Demographics    Haroon Alegria  Date of Birth. 1965  MRN. [id number]     Primary care provider. Manav Yanez MD  (Tel: [phone])    Admit date: 2/15/2022    Discharge date (blank if same as Note Date): Note Date: 2/17/2022     Reason for Hospitalization. Chief Complaint   Patient presents with   [address] Problem    Paranoid     feel like people are going to kill him         Diagnosis. Principal Problem:    Hyponatremia  Active Problems:    Acute exacerbation of chronic schizoaffective schizophrenia ([address]  Resolved Problems:    * No resolved hospital problems. [facility] T[facility]ATMENT FACILITY Course: The patient is a 58-year-old male with history of hypothyroidism, asthma, hyperlipidemia, schizophrenia who was admitted with paranoid delusions and suicidal ideation. Positive for SARS-CoV-2, remains on room air, sodium 119 on admission  Was dehydrated, hypotonic hypovolemic hyponatremia, TSH normal  IV hydration started, sodium improved to 129,  Patient remained on room air, was accepted by inpatient psych, transferred once stable  Was advised to get a BMP in 1 week  Home medications continued during hospital stay and resumed at discharge        Invasive procedures and treatments. 1. None     Consults. IP CONSULT TO PSYCHOLOGY  IP CONSULT TO PSYCHIATRY  IP CONSULT TO HOSPITALIST  IP CONSULT TO NEPHROLOGY    Physical examination on discharge day. BP (!) 121/95   Pulse 84   Temp 99.3 °F (37.4 °C) (Oral)   Resp 20   Ht 5' 11\" (1.803 m)   Wt 251 lb (113.9 kg)   SpO2 95%   BMI 35.01 kg/m²   General appearance. Alert. Looks comfortable. HEENT. Sclera clear. Moist mucus membranes. Cardiovascular. Regular rate and rhythm, normal S1, S2. No murmur. Respiratory. Not using accessory muscles. Clear to auscultation bilaterally, no wheeze.   Gastrointestinal. Abdomen soft, non-tender, not distended, normal bowel sounds  Neurology. Facial symmetry. No speech deficits. Moving all extremities equally. Extremities. No edema in lower extremities. Skin. Warm, dry, normal turgor    Condition at time of discharge stable    Medication instructions provided to patient at discharge. Medication List      CONTINUE taking these medications    aspirin EC 81 MG EC tablet  Take 1 tablet by mouth daily     atorvastatin 10 MG tablet  Commonly known as: LIPITOR  Take 1 tablet by mouth daily     benztropine 1 MG tablet  Commonly known as: COGENTIN     divalproex 500 MG DR tablet  Commonly known as: DEPAKOTE     levothyroxine 50 MCG tablet  Commonly known as: SYNTHROID  Take 1 tablet by mouth Daily     montelukast 10 MG tablet  Commonly known as: SINGULAIR  Take 1 tablet by mouth nightly     omeprazole 20 MG delayed release capsule  Commonly known as: PRILOSEC  Take 1 capsule by mouth daily     * risperiDONE 0.5 MG tablet  Commonly known as: RISPERDAL     * risperiDONE 2 MG tablet  Commonly known as: RISPERDAL         * This list has 2 medication(s) that are the same as other medications prescribed for you. Read the directions carefully, and ask your doctor or other care provider to review them with you. Discharge recommendations given to patient. Follow Up. pcp in 1 week   Disposition. Mental health  Activity. As tolerated  Diet: ADULT DIET; Regular; 1800 ml      Spent 34 minutes in discharge process.     Signed:  Gertrudis Lu MD     2/17/2022 7:24 AM

## 2022-02-17 NOTE — CARE COORDINATION
Phoned Heather Masters [phone] for placement in inpatient [place] [address] and spoke with [facility]. She stated that she will have to return call to  .  Case Management to follow

## 2022-02-17 NOTE — PROGRESS NOTES
Nephrology Progress Note  2/17/2022 4:18 PM  Subjective: Interval History: Renea Brennan is a 59 y.o. male with weak and has a sitter in room but appears more awake        Data:   Scheduled Meds:   risperiDONE  0.5 mg Oral Daily    montelukast  10 mg Oral Nightly    levothyroxine  50 mcg Oral Daily    aspirin EC  81 mg Oral Daily    atorvastatin  10 mg Oral Daily    risperiDONE  2 mg Oral Nightly    divalproex  500 mg Oral TID    pantoprazole  40 mg Oral QAM AC    benztropine  1 mg Oral BID    bisacodyl  5 mg Oral Daily    potassium bicarb-citric acid  40 mEq Oral Once     Continuous Infusions:      CBC   Recent Labs     02/15/22  1552 02/17/22  0435   WBC 9.0 7.3   HGB 14.8 15.5   HCT 41.8* 45.6    294      BMP   Recent Labs     02/15/22  2203 02/15/22  2203 02/16/22  0605 02/16/22  1432 02/16/22  2216 02/17/22  0435 02/17/22  1427   *   < > 123*   < > 128* 129* 129*   K 3.5  --  3.9  --   --  4.1  --    CL 85*  --  89*  --   --  93*  --    CO2 23  --  20*  --   --  28  --    BUN 8  --  6  --   --  7  --    CREATININE 0.5*  --  0.5*  --   --  0.8*  --     < > = values in this interval not displayed. Hepatic:   Recent Labs     02/15/22  1552 02/16/22  0605   AST 26 39*   ALT 24 43*   BILITOT 0.4 0.4   ALKPHOS 85 89     Troponin: No results for input(s): TROPONINI in the last 72 hours. BNP: No results for input(s): BNP in the last 72 hours. Lipids: No results for input(s): CHOL, HDL in the last 72 hours. Invalid input(s): LDLCALCU  ABGs: No results found for: PHART, PO2ART, HGB8RNX  INR: No results for input(s): INR in the last 72 hours.   Renal Labs  Albumin:    Lab Results   Component Value Date    LABALBU 3.9 02/16/2022     Calcium:    Lab Results   Component Value Date    CALCIUM 8.7 02/17/2022     Phosphorus:  No results found for: PHOS  U/A:    Lab Results   Component Value Date    NITRU NEGATIVE 02/15/2022    COLORU YELLOW 02/15/2022    WBCUA 2 02/15/2022    RBCUA 2 02/15/2022    MUCUS MANY 02/15/2022    BACTERIA NEGATIVE 02/15/2022    CLARITYU SLIGHTLY CLOUDY 02/15/2022    SPECGRAV 1.020 02/15/2022    UROBILINOGEN 2.0 02/15/2022    BILIRUBINUR NEGATIVE 02/15/2022    BLOODU SMALL 02/15/2022    KETUA TRACE 02/15/2022     ABG:  No results found for: PHART, SBR4JTG, PO2ART, QYP4GHF, BEART, THGBART, BTY1NHX, N5WBMKHM  HgBA1c:    Lab Results   Component Value Date    LABA1C 6.0 11/08/2021     Microalbumen/Creatinine ratio:  No components found for: RUCREAT  TSH:    Lab Results   Component Value Date    TSH 1.25 11/08/2021     IRON:  No results found for: IRON  Iron Saturation:  No components found for: PERCENTFE  TIBC:  No results found for: TIBC  FERRITIN:  No results found for: FERRITIN  RPR:  No results found for: RPR  RAYRAY:  No results found for: ANATITER, RAYRAY  24 Hour Urine for Creatinine Clearance:  No components found for: CREAT4, UHRS10, UTV10      Objective:   I/O: No intake/output data recorded. No intake/output data recorded. No intake/output data recorded. Vitals: /76   Pulse 90   Temp 98.8 °F (37.1 °C) (Oral)   Resp 16   Ht 5' 11\" (1.803 m)   Wt 251 lb (113.9 kg)   SpO2 90%   BMI 35.01 kg/m²  {  General appearance: awake weak  HEENT: Head: Normal, normocephalic, atraumatic.   Neck: supple, symmetrical, trachea midline  Lungs: diminished breath sounds bilaterally  Heart: S1, S2 normal  Abdomen: abnormal findings:  soft nt  Extremities: edema trace  Neurologic: Mental status: alertness: alert somewhat anxious has a sitter        Assessment and Plan:      IMP:  #1 paranoid schizophrenia  #2 hyponatremia  #3 hypertension  #4 hypokalemia    Plan     #1 follow-up with psychiatry recommendations for inpatient admission  #2 sodium slowly improving  #3 blood pressure stable  #4 monitor potassium  From renal standpoint okay for discharge inpatient psychiatry  We will follow          Miguel Ricci MD, MD

## 2022-02-18 VITALS
HEART RATE: 95 BPM | TEMPERATURE: 97.7 F | BODY MASS INDEX: 35.14 KG/M2 | OXYGEN SATURATION: 93 % | DIASTOLIC BLOOD PRESSURE: 75 MMHG | WEIGHT: 251 LBS | RESPIRATION RATE: 16 BRPM | HEIGHT: 71 IN | SYSTOLIC BLOOD PRESSURE: 151 MMHG

## 2022-02-18 LAB
ANION GAP SERPL CALCULATED.3IONS-SCNC: 10 MMOL/L (ref 4–16)
BUN BLDV-MCNC: 14 MG/DL (ref 6–23)
CALCIUM SERPL-MCNC: 8.5 MG/DL (ref 8.3–10.6)
CHLORIDE BLD-SCNC: 100 MMOL/L (ref 99–110)
CO2: 26 MMOL/L (ref 21–32)
CREAT SERPL-MCNC: 0.7 MG/DL (ref 0.9–1.3)
GFR AFRICAN AMERICAN: >60 ML/MIN/1.73M2
GFR NON-AFRICAN AMERICAN: >60 ML/MIN/1.73M2
GLUCOSE BLD-MCNC: 107 MG/DL (ref 70–99)
POTASSIUM SERPL-SCNC: 3.9 MMOL/L (ref 3.5–5.1)
SODIUM BLD-SCNC: 136 MMOL/L (ref 135–145)

## 2022-02-18 PROCEDURE — 6370000000 HC RX 637 (ALT 250 FOR IP): Performed by: NURSE PRACTITIONER

## 2022-02-18 PROCEDURE — 84295 ASSAY OF SERUM SODIUM: CPT

## 2022-02-18 PROCEDURE — 36415 COLL VENOUS BLD VENIPUNCTURE: CPT

## 2022-02-18 PROCEDURE — 94761 N-INVAS EAR/PLS OXIMETRY MLT: CPT

## 2022-02-18 PROCEDURE — 80048 BASIC METABOLIC PNL TOTAL CA: CPT

## 2022-02-18 RX ADMIN — BISACODYL 5 MG: 5 TABLET, COATED ORAL at 09:00

## 2022-02-18 RX ADMIN — LEVOTHYROXINE SODIUM 50 MCG: 0.05 TABLET ORAL at 06:16

## 2022-02-18 RX ADMIN — PANTOPRAZOLE SODIUM 40 MG: 40 TABLET, DELAYED RELEASE ORAL at 06:15

## 2022-02-18 RX ADMIN — ATORVASTATIN CALCIUM 10 MG: 10 TABLET, FILM COATED ORAL at 09:00

## 2022-02-18 RX ADMIN — BENZTROPINE MESYLATE 1 MG: 1 TABLET ORAL at 09:01

## 2022-02-18 RX ADMIN — ASPIRIN 81 MG: 81 TABLET, COATED ORAL at 09:00

## 2022-02-18 RX ADMIN — RISPERIDONE 0.5 MG: 0.5 TABLET ORAL at 09:00

## 2022-02-18 ASSESSMENT — PAIN SCALES - GENERAL: PAINLEVEL_OUTOF10: 0

## 2022-02-18 NOTE — PROGRESS NOTES
Spoke with access center and [facility] for pysch has accepted patient under Dr. Albina Carvalho accepting dr. Adam Faria pink slip at this time to number [phone] per access center request.

## 2022-02-18 NOTE — PROGRESS NOTES
Patient aox4. Discharge paperwork reviewed and signed by patient. Given to [employer] EMT representative to be given upon arrival to [facility]. VS are WNL. No complaints of pain. Patient verbalized understanding of discharge instructions and is fully aware of his desination. C-SSR score of 15 at this time. No s/s of distress noted. Report given to EMT.

## 2022-02-18 NOTE — PLAN OF CARE
Problem: Airway Clearance - Ineffective  Goal: Achieve or maintain patent airway  2/18/2022 1241 by Cammie Sanchez RN  Outcome: Met This Shift  2/18/2022 1240 by Cammie Sanchez RN  Outcome: Ongoing  2/17/2022 2248 by Bartolome Talavera RN  Outcome: Ongoing     Problem: Gas Exchange - Impaired  Goal: Absence of hypoxia  2/18/2022 1241 by Cammie Sanchez RN  Outcome: Met This Shift  2/18/2022 1240 by Cammie Sanchez RN  Outcome: Ongoing  2/17/2022 2248 by Bartolome Talavera RN  Outcome: Ongoing  Goal: Promote optimal lung function  2/18/2022 1241 by Cammie Sanchez RN  Outcome: Met This Shift  2/18/2022 1240 by Cammie Sanchez RN  Outcome: Ongoing  2/17/2022 2248 by Bartolome Talavera RN  Outcome: Ongoing     Problem: Breathing Pattern - Ineffective  Goal: Ability to achieve and maintain a regular respiratory rate  2/18/2022 1241 by Cammie Sanchez RN  Outcome: Met This Shift  2/18/2022 1240 by Cammie Sanchez RN  Outcome: Ongoing  2/17/2022 2248 by Bartolome Talavera RN  Outcome: Ongoing     Problem:  Body Temperature -  Risk of, Imbalanced  Goal: Ability to maintain a body temperature within defined limits  2/18/2022 1241 by Cammie Sanchez RN  Outcome: Met This Shift  2/18/2022 1240 by Cammie Sanchez RN  Outcome: Ongoing  2/17/2022 2248 by Bartolome Talavera RN  Outcome: Ongoing  Goal: Will regain or maintain usual level of consciousness  2/18/2022 1241 by Cammie Sanchez RN  Outcome: Met This Shift  2/18/2022 1240 by Cammie Sanchez RN  Outcome: Ongoing  2/17/2022 2248 by Bartolome Talavera RN  Outcome: Ongoing  Goal: Complications related to the disease process, condition or treatment will be avoided or minimized  2/18/2022 1241 by Cammie Sanchez RN  Outcome: Met This Shift  2/18/2022 1240 by Cammie Sanchez RN  Outcome: Ongoing  2/17/2022 2248 by Bartolome Talavera RN  Outcome: Ongoing     Problem: Isolation Precautions - Risk of Spread of Infection  Goal: Prevent transmission of infection  2/18/2022 2194 by Beto Sarabia RN  Outcome: Met This Shift  2/18/2022 1240 by Beto Sarabia RN  Outcome: Ongoing  2/17/2022 2248 by Hailey Carrasco RN  Outcome: Ongoing     Problem: Nutrition Deficits  Goal: Optimize nutritional status  2/18/2022 1241 by Beto Sarabia RN  Outcome: Met This Shift  2/18/2022 1240 by Beto Sarabia RN  Outcome: Ongoing  2/17/2022 2248 by Hailey Carrasco RN  Outcome: Ongoing     Problem: Risk for Fluid Volume Deficit  Goal: Maintain normal heart rhythm  2/18/2022 1241 by Beto Sarabia RN  Outcome: Met This Shift  2/18/2022 1240 by Beto Sarabia RN  Outcome: Ongoing  2/17/2022 2248 by Hailey Carrasco RN  Outcome: Ongoing  Goal: Maintain absence of muscle cramping  2/18/2022 1241 by Beto Sarabia RN  Outcome: Met This Shift  2/18/2022 1240 by Beto Sarabia RN  Outcome: Ongoing  2/17/2022 2248 by Hailey Carrasco RN  Outcome: Ongoing  Goal: Maintain normal serum potassium, sodium, calcium, phosphorus, and pH  2/18/2022 1241 by Beto Sarabia RN  Outcome: Met This Shift  2/18/2022 1240 by Beto Sarabia RN  Outcome: Ongoing  2/17/2022 2248 by Hailey Carrasco RN  Outcome: Ongoing     Problem: Loneliness or Risk for Loneliness  Goal: Demonstrate positive use of time alone when socialization is not possible  2/18/2022 1241 by Beto Sarabia RN  Outcome: Met This Shift  2/18/2022 1240 by Beto Sarabia RN  Outcome: Ongoing  2/17/2022 2248 by Hailey Carrasco RN  Outcome: Ongoing     Problem: Fatigue  Goal: Verbalize increase energy and improved vitality  2/18/2022 1241 by Beto Sarabia RN  Outcome: Met This Shift  2/18/2022 1240 by Beto Sarabia RN  Outcome: Ongoing  2/17/2022 2248 by Hailey Carrasco RN  Outcome: Ongoing     Problem: Patient Education: Go to Patient Education Activity  Goal: Patient/Family Education  2/18/2022 1241 by Beto Sarabia RN  Outcome: Met This Shift  2/18/2022 1240 by Beto Sarabia RN  Outcome: Ongoing  2/17/2022 2248 by Radha Avendano Figueroa Soriano RN  Outcome: Ongoing     Problem: Suicide risk  Goal: Provide patient with safe environment  Description: Provide patient with safe environment  2/18/2022 1241 by Mary Hawthorne RN  Outcome: Met This Shift  2/18/2022 1240 by Mary Hawthorne RN  Outcome: Ongoing  2/17/2022 2248 by Tiki Canela RN  Outcome: Ongoing

## 2022-02-18 NOTE — PROGRESS NOTES
Patient notified of transport in am per [facility] between 11-1130am to [facility] for psych verbalized unnderstanding.

## 2022-02-18 NOTE — PLAN OF CARE
Problem: Airway Clearance - Ineffective  Goal: Achieve or maintain patent airway  2/18/2022 1240 by Chaim Yang RN  Outcome: Ongoing  2/17/2022 2248 by Helen Jamil RN  Outcome: Ongoing     Problem: Gas Exchange - Impaired  Goal: Absence of hypoxia  2/18/2022 1240 by Chaim Yang RN  Outcome: Ongoing  2/17/2022 2248 by Helen Jamil RN  Outcome: Ongoing  Goal: Promote optimal lung function  2/18/2022 1240 by Chaim Yang RN  Outcome: Ongoing  2/17/2022 2248 by Helen Jamil RN  Outcome: Ongoing     Problem: Breathing Pattern - Ineffective  Goal: Ability to achieve and maintain a regular respiratory rate  2/18/2022 1240 by Chaim Yang RN  Outcome: Ongoing  2/17/2022 2248 by Helen Jamil RN  Outcome: Ongoing     Problem:  Body Temperature -  Risk of, Imbalanced  Goal: Ability to maintain a body temperature within defined limits  2/18/2022 1240 by Chaim Yang RN  Outcome: Ongoing  2/17/2022 2248 by Helen Jamil RN  Outcome: Ongoing  Goal: Will regain or maintain usual level of consciousness  2/18/2022 1240 by Chaim Yang RN  Outcome: Ongoing  2/17/2022 2248 by Helen Jamil RN  Outcome: Ongoing  Goal: Complications related to the disease process, condition or treatment will be avoided or minimized  2/18/2022 1240 by Chaim Yang RN  Outcome: Ongoing  2/17/2022 2248 by Helen Jamil RN  Outcome: Ongoing     Problem: Isolation Precautions - Risk of Spread of Infection  Goal: Prevent transmission of infection  2/18/2022 1240 by Chaim Yang RN  Outcome: Ongoing  2/17/2022 2248 by Helen Jamil RN  Outcome: Ongoing     Problem: Nutrition Deficits  Goal: Optimize nutritional status  2/18/2022 1240 by Chaim Yang RN  Outcome: Ongoing  2/17/2022 2248 by Helen Jamil RN  Outcome: Ongoing     Problem: Risk for Fluid Volume Deficit  Goal: Maintain normal heart rhythm  2/18/2022 1240 by Chaim Yang RN  Outcome: Ongoing  2/17/2022 2248 by Lenore Jeans Renetta Pool RN  Outcome: Ongoing  Goal: Maintain absence of muscle cramping  2/18/2022 1240 by Rose Fowler RN  Outcome: Ongoing  2/17/2022 2248 by Deya Calles RN  Outcome: Ongoing  Goal: Maintain normal serum potassium, sodium, calcium, phosphorus, and pH  2/18/2022 1240 by Rose Fowler RN  Outcome: Ongoing  2/17/2022 2248 by Deya Calles RN  Outcome: Ongoing     Problem: Loneliness or Risk for Loneliness  Goal: Demonstrate positive use of time alone when socialization is not possible  2/18/2022 1240 by Rose Fowler RN  Outcome: Ongoing  2/17/2022 2248 by Deya Calles RN  Outcome: Ongoing     Problem: Fatigue  Goal: Verbalize increase energy and improved vitality  2/18/2022 1240 by Rose Fowler RN  Outcome: Ongoing  2/17/2022 2248 by Deya Calles RN  Outcome: Ongoing     Problem: Patient Education: Go to Patient Education Activity  Goal: Patient/Family Education  2/18/2022 1240 by Rose Fowler RN  Outcome: Ongoing  2/17/2022 2248 by Deya Calles RN  Outcome: Ongoing     Problem: Suicide risk  Goal: Provide patient with safe environment  Description: Provide patient with safe environment  2/18/2022 1240 by Rose Fowler RN  Outcome: Ongoing  2/17/2022 2248 by Deya Calles RN  Outcome: Ongoing

## 2022-10-18 DIAGNOSIS — J45.20 MILD INTERMITTENT ASTHMA WITHOUT COMPLICATION: ICD-10-CM

## 2022-10-18 RX ORDER — MONTELUKAST SODIUM 10 MG/1
10 TABLET ORAL NIGHTLY
Qty: 90 TABLET | Refills: 3 | Status: SHIPPED | OUTPATIENT
Start: 2022-10-18

## 2022-10-25 ENCOUNTER — COMMUNITY OUTREACH (OUTPATIENT)
Dept: FAMILY MEDICINE CLINIC | Age: 57
End: 2022-10-25